# Patient Record
Sex: FEMALE | Race: ASIAN | NOT HISPANIC OR LATINO
[De-identification: names, ages, dates, MRNs, and addresses within clinical notes are randomized per-mention and may not be internally consistent; named-entity substitution may affect disease eponyms.]

---

## 2017-01-09 ENCOUNTER — RX RENEWAL (OUTPATIENT)
Age: 27
End: 2017-01-09

## 2017-02-21 ENCOUNTER — RESULT REVIEW (OUTPATIENT)
Age: 27
End: 2017-02-21

## 2017-02-22 ENCOUNTER — APPOINTMENT (OUTPATIENT)
Dept: HEMATOLOGY ONCOLOGY | Facility: CLINIC | Age: 27
End: 2017-02-22

## 2017-02-22 ENCOUNTER — OUTPATIENT (OUTPATIENT)
Dept: OUTPATIENT SERVICES | Facility: HOSPITAL | Age: 27
LOS: 1 days | Discharge: HOME | End: 2017-02-22

## 2017-02-22 VITALS
DIASTOLIC BLOOD PRESSURE: 71 MMHG | TEMPERATURE: 98.2 F | RESPIRATION RATE: 16 BRPM | WEIGHT: 120.37 LBS | BODY MASS INDEX: 22.74 KG/M2 | OXYGEN SATURATION: 95 % | HEART RATE: 95 BPM | SYSTOLIC BLOOD PRESSURE: 109 MMHG

## 2017-02-22 DIAGNOSIS — D56.3 THALASSEMIA MINOR: ICD-10-CM

## 2017-02-22 LAB
BASOPHILS # BLD AUTO: 0 K/UL — SIGNIFICANT CHANGE UP (ref 0–0.2)
BASOPHILS NFR BLD AUTO: 0.4 % — SIGNIFICANT CHANGE UP (ref 0–2)
EOSINOPHIL # BLD AUTO: 0.1 K/UL — SIGNIFICANT CHANGE UP (ref 0–0.5)
EOSINOPHIL NFR BLD AUTO: 0.7 % — SIGNIFICANT CHANGE UP (ref 0–6)
HCT VFR BLD CALC: 31.2 % — LOW (ref 34.5–45)
HGB BLD-MCNC: 9.9 G/DL — LOW (ref 11.5–15.5)
LYMPHOCYTES # BLD AUTO: 2.6 K/UL — SIGNIFICANT CHANGE UP (ref 1–3.3)
LYMPHOCYTES # BLD AUTO: 25.8 % — SIGNIFICANT CHANGE UP (ref 13–44)
MCHC RBC-ENTMCNC: 20.4 PG — LOW (ref 27–34)
MCHC RBC-ENTMCNC: 31.7 G/DL — LOW (ref 32–36)
MCV RBC AUTO: 64.5 FL — LOW (ref 80–100)
MONOCYTES # BLD AUTO: 0.6 K/UL — SIGNIFICANT CHANGE UP (ref 0–0.9)
MONOCYTES NFR BLD AUTO: 5.8 % — SIGNIFICANT CHANGE UP (ref 2–14)
NEUTROPHILS # BLD AUTO: 6.7 K/UL — SIGNIFICANT CHANGE UP (ref 1.8–7.4)
NEUTROPHILS NFR BLD AUTO: 67.2 % — SIGNIFICANT CHANGE UP (ref 43–77)
PLATELET # BLD AUTO: 237 K/UL — SIGNIFICANT CHANGE UP (ref 150–400)
RBC # BLD: 4.84 M/UL — SIGNIFICANT CHANGE UP (ref 3.8–5.2)
RBC # FLD: 13.2 % — SIGNIFICANT CHANGE UP (ref 10.3–14.5)
WBC # BLD: 10 K/UL — SIGNIFICANT CHANGE UP (ref 3.8–10.5)
WBC # FLD AUTO: 10 K/UL — SIGNIFICANT CHANGE UP (ref 3.8–10.5)

## 2017-04-07 ENCOUNTER — RX RENEWAL (OUTPATIENT)
Age: 27
End: 2017-04-07

## 2017-04-27 ENCOUNTER — EMERGENCY (EMERGENCY)
Facility: HOSPITAL | Age: 27
LOS: 1 days | Discharge: HOME | End: 2017-04-27
Attending: EMERGENCY MEDICINE | Admitting: EMERGENCY MEDICINE
Payer: COMMERCIAL

## 2017-04-27 VITALS
DIASTOLIC BLOOD PRESSURE: 75 MMHG | OXYGEN SATURATION: 100 % | SYSTOLIC BLOOD PRESSURE: 125 MMHG | RESPIRATION RATE: 18 BRPM | TEMPERATURE: 98 F | HEART RATE: 72 BPM

## 2017-04-27 PROCEDURE — 99284 EMERGENCY DEPT VISIT MOD MDM: CPT

## 2017-04-27 RX ORDER — IBUPROFEN 200 MG
400 TABLET ORAL ONCE
Qty: 0 | Refills: 0 | Status: COMPLETED | OUTPATIENT
Start: 2017-04-27 | End: 2017-04-27

## 2017-04-27 RX ORDER — CYCLOBENZAPRINE HYDROCHLORIDE 10 MG/1
1 TABLET, FILM COATED ORAL
Qty: 10 | Refills: 0 | OUTPATIENT
Start: 2017-04-27 | End: 2017-05-02

## 2017-04-27 RX ORDER — IBUPROFEN 200 MG
1 TABLET ORAL
Qty: 10 | Refills: 0 | OUTPATIENT
Start: 2017-04-27 | End: 2017-05-02

## 2017-04-27 RX ORDER — KETOROLAC TROMETHAMINE 30 MG/ML
15 SYRINGE (ML) INJECTION ONCE
Qty: 0 | Refills: 0 | Status: DISCONTINUED | OUTPATIENT
Start: 2017-04-27 | End: 2017-04-27

## 2017-04-27 RX ADMIN — Medication 400 MILLIGRAM(S): at 18:38

## 2017-04-27 NOTE — ED PROVIDER NOTE - MEDICAL DECISION MAKING DETAILS
27 y/o female with muscle spasm after incorrect bending while lifting a table. Will give pain medication and D/C home on pain meds and muscle relaxants.

## 2017-04-27 NOTE — ED ADULT TRIAGE NOTE - CHIEF COMPLAINT QUOTE
pt c/o lower back pain x 1 week. pt states pain started after doing some cleaning and lifting. pt denies dysuria.

## 2017-04-27 NOTE — ED PROVIDER NOTE - NS ED MD SCRIBE ATTENDING SCRIBE SECTIONS
VITAL SIGNS( Pullset)/DISPOSITION/PAST MEDICAL/SURGICAL/SOCIAL HISTORY/HISTORY OF PRESENT ILLNESS/PHYSICAL EXAM/REVIEW OF SYSTEMS/HIV

## 2017-04-27 NOTE — ED PROVIDER NOTE - PLAN OF CARE
Rest, drink plenty of fluids.  Advance activity as tolerated.  Continue all previously prescribed medications as directed.  Take Motrin 400 mg every 12 hours as needed for moderate pain -- take with food. Take Flexeril 5 mg every 12 hours as needed for muscle spasm -- causes drowsiness; no drinking alcohol or driving with this medication. Follow up with your primary care physician in 48-72 hours- bring copies of your results.  Return to the ER for worsening or persistent symptoms, and/or ANY NEW OR CONCERNING SYMPTOMS. If you have issues obtaining follow up, please call: [phone] to obtain a doctor or specialist who takes your insurance in your area.

## 2017-04-27 NOTE — ED PROVIDER NOTE - CARE PLAN
Principal Discharge DX:	Back pain Principal Discharge DX:	Back pain  Instructions for follow-up, activity and diet:	Rest, drink plenty of fluids.  Advance activity as tolerated.  Continue all previously prescribed medications as directed.  Take Motrin 400 mg every 12 hours as needed for moderate pain -- take with food. Take Flexeril 5 mg every 12 hours as needed for muscle spasm -- causes drowsiness; no drinking alcohol or driving with this medication. Follow up with your primary care physician in 48-72 hours- bring copies of your results.  Return to the ER for worsening or persistent symptoms, and/or ANY NEW OR CONCERNING SYMPTOMS. If you have issues obtaining follow up, please call: [phone] to obtain a doctor or specialist who takes your insurance in your area.

## 2017-04-27 NOTE — ED PROVIDER NOTE - CHPI ED SYMPTOMS NEG
no bowel dysfunction/no bladder dysfunction/no fever no chills, no abd pain, no other complaints/no tingling/no numbness

## 2017-04-27 NOTE — ED PROVIDER NOTE - OBJECTIVE STATEMENT
25 y/o female with PMHx of thalassemia trait presents to the ED c/o lower back pain x 1 week s/p lifting. Pt reports lifting a heavy table prior to the onset of the pain. Denies fever, chills, abd pain, N/V, numbness, tingling, weakness, bowel/bladder incontinence, and any other complaints.

## 2017-07-11 ENCOUNTER — RX RENEWAL (OUTPATIENT)
Age: 27
End: 2017-07-11

## 2017-07-28 ENCOUNTER — OUTPATIENT (OUTPATIENT)
Dept: OUTPATIENT SERVICES | Facility: HOSPITAL | Age: 27
LOS: 1 days | Discharge: HOME | End: 2017-07-28

## 2017-07-28 DIAGNOSIS — D56.3 THALASSEMIA MINOR: ICD-10-CM

## 2017-08-02 ENCOUNTER — RESULT REVIEW (OUTPATIENT)
Age: 27
End: 2017-08-02

## 2017-08-02 ENCOUNTER — APPOINTMENT (OUTPATIENT)
Dept: HEMATOLOGY ONCOLOGY | Facility: CLINIC | Age: 27
End: 2017-08-02
Payer: COMMERCIAL

## 2017-08-02 VITALS
RESPIRATION RATE: 16 BRPM | OXYGEN SATURATION: 98 % | HEART RATE: 88 BPM | TEMPERATURE: 98 F | DIASTOLIC BLOOD PRESSURE: 70 MMHG | BODY MASS INDEX: 22.29 KG/M2 | SYSTOLIC BLOOD PRESSURE: 110 MMHG | WEIGHT: 117.95 LBS

## 2017-08-02 LAB
BASOPHILS # BLD AUTO: 0 K/UL — SIGNIFICANT CHANGE UP (ref 0–0.2)
BASOPHILS NFR BLD AUTO: 0.4 % — SIGNIFICANT CHANGE UP (ref 0–2)
EOSINOPHIL # BLD AUTO: 0.1 K/UL — SIGNIFICANT CHANGE UP (ref 0–0.5)
EOSINOPHIL NFR BLD AUTO: 1.2 % — SIGNIFICANT CHANGE UP (ref 0–6)
HCT VFR BLD CALC: 30.1 % — LOW (ref 34.5–45)
HGB BLD-MCNC: 10.3 G/DL — LOW (ref 11.5–15.5)
LYMPHOCYTES # BLD AUTO: 2.2 K/UL — SIGNIFICANT CHANGE UP (ref 1–3.3)
LYMPHOCYTES # BLD AUTO: 27.3 % — SIGNIFICANT CHANGE UP (ref 13–44)
MCHC RBC-ENTMCNC: 22 PG — LOW (ref 27–34)
MCHC RBC-ENTMCNC: 34.1 G/DL — SIGNIFICANT CHANGE UP (ref 32–36)
MCV RBC AUTO: 64.5 FL — LOW (ref 80–100)
MONOCYTES # BLD AUTO: 0.7 K/UL — SIGNIFICANT CHANGE UP (ref 0–0.9)
MONOCYTES NFR BLD AUTO: 9 % — SIGNIFICANT CHANGE UP (ref 2–14)
NEUTROPHILS # BLD AUTO: 5.1 K/UL — SIGNIFICANT CHANGE UP (ref 1.8–7.4)
NEUTROPHILS NFR BLD AUTO: 62 % — SIGNIFICANT CHANGE UP (ref 43–77)
PLATELET # BLD AUTO: 211 K/UL — SIGNIFICANT CHANGE UP (ref 150–400)
RBC # BLD: 4.67 M/UL — SIGNIFICANT CHANGE UP (ref 3.8–5.2)
RBC # FLD: 13.2 % — SIGNIFICANT CHANGE UP (ref 10.3–14.5)
WBC # BLD: 8.2 K/UL — SIGNIFICANT CHANGE UP (ref 3.8–10.5)
WBC # FLD AUTO: 8.2 K/UL — SIGNIFICANT CHANGE UP (ref 3.8–10.5)

## 2017-08-02 PROCEDURE — 99213 OFFICE O/P EST LOW 20 MIN: CPT

## 2017-08-03 LAB
FERRITIN SERPL-MCNC: 329 NG/ML
IRON SATN MFR SERPL: 47 %
IRON SERPL-MCNC: 118 UG/DL
TIBC SERPL-MCNC: 249 UG/DL
UIBC SERPL-MCNC: 131 UG/DL

## 2017-11-06 ENCOUNTER — APPOINTMENT (OUTPATIENT)
Dept: ANTEPARTUM | Facility: CLINIC | Age: 27
End: 2017-11-06
Payer: COMMERCIAL

## 2017-11-06 ENCOUNTER — ASOB RESULT (OUTPATIENT)
Age: 27
End: 2017-11-06

## 2017-11-06 PROCEDURE — 76813 OB US NUCHAL MEAS 1 GEST: CPT

## 2017-11-06 PROCEDURE — 36416 COLLJ CAPILLARY BLOOD SPEC: CPT

## 2017-11-06 PROCEDURE — 76801 OB US < 14 WKS SINGLE FETUS: CPT

## 2018-01-08 ENCOUNTER — APPOINTMENT (OUTPATIENT)
Dept: ANTEPARTUM | Facility: CLINIC | Age: 28
End: 2018-01-08
Payer: COMMERCIAL

## 2018-01-08 ENCOUNTER — ASOB RESULT (OUTPATIENT)
Age: 28
End: 2018-01-08

## 2018-01-08 PROCEDURE — 76811 OB US DETAILED SNGL FETUS: CPT

## 2018-01-08 PROCEDURE — 76805 OB US >/= 14 WKS SNGL FETUS: CPT

## 2018-01-12 ENCOUNTER — ASOB RESULT (OUTPATIENT)
Age: 28
End: 2018-01-12

## 2018-01-12 ENCOUNTER — APPOINTMENT (OUTPATIENT)
Dept: ANTEPARTUM | Facility: CLINIC | Age: 28
End: 2018-01-12
Payer: COMMERCIAL

## 2018-01-12 PROCEDURE — 76816 OB US FOLLOW-UP PER FETUS: CPT

## 2018-02-02 ENCOUNTER — OUTPATIENT (OUTPATIENT)
Dept: OUTPATIENT SERVICES | Facility: HOSPITAL | Age: 28
LOS: 1 days | Discharge: HOME | End: 2018-02-02

## 2018-02-02 DIAGNOSIS — D56.3 THALASSEMIA MINOR: ICD-10-CM

## 2018-02-07 ENCOUNTER — RESULT REVIEW (OUTPATIENT)
Age: 28
End: 2018-02-07

## 2018-02-07 ENCOUNTER — APPOINTMENT (OUTPATIENT)
Dept: HEMATOLOGY ONCOLOGY | Facility: CLINIC | Age: 28
End: 2018-02-07
Payer: COMMERCIAL

## 2018-02-07 VITALS
SYSTOLIC BLOOD PRESSURE: 107 MMHG | BODY MASS INDEX: 25.2 KG/M2 | RESPIRATION RATE: 16 BRPM | WEIGHT: 133.38 LBS | HEART RATE: 90 BPM | TEMPERATURE: 98.5 F | OXYGEN SATURATION: 100 % | DIASTOLIC BLOOD PRESSURE: 68 MMHG

## 2018-02-07 LAB
BASOPHILS # BLD AUTO: 0 K/UL — SIGNIFICANT CHANGE UP (ref 0–0.2)
BASOPHILS NFR BLD AUTO: 0.2 % — SIGNIFICANT CHANGE UP (ref 0–2)
EOSINOPHIL # BLD AUTO: 0 K/UL — SIGNIFICANT CHANGE UP (ref 0–0.5)
EOSINOPHIL NFR BLD AUTO: 0 % — SIGNIFICANT CHANGE UP (ref 0–6)
FERRITIN SERPL-MCNC: 316 NG/ML
HCT VFR BLD CALC: 23.8 % — LOW (ref 34.5–45)
HGB BLD-MCNC: 8.2 G/DL — LOW (ref 11.5–15.5)
IRON SATN MFR SERPL: 55 %
IRON SERPL-MCNC: 167 UG/DL
LYMPHOCYTES # BLD AUTO: 17.7 % — SIGNIFICANT CHANGE UP (ref 13–44)
LYMPHOCYTES # BLD AUTO: 2.4 K/UL — SIGNIFICANT CHANGE UP (ref 1–3.3)
MCHC RBC-ENTMCNC: 23.1 PG — LOW (ref 27–34)
MCHC RBC-ENTMCNC: 34.6 G/DL — SIGNIFICANT CHANGE UP (ref 32–36)
MCV RBC AUTO: 66.8 FL — LOW (ref 80–100)
MONOCYTES # BLD AUTO: 1.1 K/UL — HIGH (ref 0–0.9)
MONOCYTES NFR BLD AUTO: 7.8 % — SIGNIFICANT CHANGE UP (ref 2–14)
NEUTROPHILS # BLD AUTO: 10.2 K/UL — HIGH (ref 1.8–7.4)
NEUTROPHILS NFR BLD AUTO: 74.3 % — SIGNIFICANT CHANGE UP (ref 43–77)
PLATELET # BLD AUTO: 285 K/UL — SIGNIFICANT CHANGE UP (ref 150–400)
RBC # BLD: 3.56 M/UL — LOW (ref 3.8–5.2)
RBC # FLD: 14.6 % — HIGH (ref 10.3–14.5)
TIBC SERPL-MCNC: 306 UG/DL
UIBC SERPL-MCNC: 139 UG/DL
VIT B12 SERPL-MCNC: 1587 PG/ML
WBC # BLD: 13.8 K/UL — HIGH (ref 3.8–10.5)
WBC # FLD AUTO: 13.8 K/UL — HIGH (ref 3.8–10.5)

## 2018-02-07 PROCEDURE — 99214 OFFICE O/P EST MOD 30 MIN: CPT

## 2018-03-02 ENCOUNTER — RESULT REVIEW (OUTPATIENT)
Age: 28
End: 2018-03-02

## 2018-03-02 ENCOUNTER — OUTPATIENT (OUTPATIENT)
Dept: OUTPATIENT SERVICES | Facility: HOSPITAL | Age: 28
LOS: 1 days | End: 2018-03-02
Payer: COMMERCIAL

## 2018-03-02 ENCOUNTER — APPOINTMENT (OUTPATIENT)
Dept: HEMATOLOGY ONCOLOGY | Facility: CLINIC | Age: 28
End: 2018-03-02

## 2018-03-02 DIAGNOSIS — D56.3 THALASSEMIA MINOR: ICD-10-CM

## 2018-03-02 LAB
BASOPHILS # BLD AUTO: 0 K/UL — SIGNIFICANT CHANGE UP (ref 0–0.2)
BASOPHILS NFR BLD AUTO: 0.3 % — SIGNIFICANT CHANGE UP (ref 0–2)
BLD GP AB SCN SERPL QL: NEGATIVE — SIGNIFICANT CHANGE UP
EOSINOPHIL # BLD AUTO: 0.1 K/UL — SIGNIFICANT CHANGE UP (ref 0–0.5)
EOSINOPHIL NFR BLD AUTO: 0.8 % — SIGNIFICANT CHANGE UP (ref 0–6)
HCT VFR BLD CALC: 25 % — LOW (ref 34.5–45)
HGB BLD-MCNC: 8.4 G/DL — LOW (ref 11.5–15.5)
LYMPHOCYTES # BLD AUTO: 19 % — SIGNIFICANT CHANGE UP (ref 13–44)
LYMPHOCYTES # BLD AUTO: 2.5 K/UL — SIGNIFICANT CHANGE UP (ref 1–3.3)
MCHC RBC-ENTMCNC: 22.9 PG — LOW (ref 27–34)
MCHC RBC-ENTMCNC: 33.5 G/DL — SIGNIFICANT CHANGE UP (ref 32–36)
MCV RBC AUTO: 68.4 FL — LOW (ref 80–100)
MONOCYTES # BLD AUTO: 0.9 K/UL — SIGNIFICANT CHANGE UP (ref 0–0.9)
MONOCYTES NFR BLD AUTO: 6.9 % — SIGNIFICANT CHANGE UP (ref 2–14)
NEUTROPHILS # BLD AUTO: 9.5 K/UL — HIGH (ref 1.8–7.4)
NEUTROPHILS NFR BLD AUTO: 73.1 % — SIGNIFICANT CHANGE UP (ref 43–77)
PLATELET # BLD AUTO: 278 K/UL — SIGNIFICANT CHANGE UP (ref 150–400)
RBC # BLD: 3.65 M/UL — LOW (ref 3.8–5.2)
RBC # FLD: 15.3 % — HIGH (ref 10.3–14.5)
RH IG SCN BLD-IMP: POSITIVE — SIGNIFICANT CHANGE UP
RH IG SCN BLD-IMP: POSITIVE — SIGNIFICANT CHANGE UP
WBC # BLD: 13 K/UL — HIGH (ref 3.8–10.5)
WBC # FLD AUTO: 13 K/UL — HIGH (ref 3.8–10.5)

## 2018-03-02 PROCEDURE — 86923 COMPATIBILITY TEST ELECTRIC: CPT

## 2018-03-02 PROCEDURE — 86900 BLOOD TYPING SEROLOGIC ABO: CPT

## 2018-03-02 PROCEDURE — 86901 BLOOD TYPING SEROLOGIC RH(D): CPT

## 2018-03-02 PROCEDURE — 86850 RBC ANTIBODY SCREEN: CPT

## 2018-03-03 ENCOUNTER — APPOINTMENT (OUTPATIENT)
Dept: INFUSION THERAPY | Facility: HOSPITAL | Age: 28
End: 2018-03-03

## 2018-03-06 ENCOUNTER — APPOINTMENT (OUTPATIENT)
Dept: MATERNAL FETAL MEDICINE | Facility: CLINIC | Age: 28
End: 2018-03-06
Payer: COMMERCIAL

## 2018-03-06 ENCOUNTER — ASOB RESULT (OUTPATIENT)
Age: 28
End: 2018-03-06

## 2018-03-06 DIAGNOSIS — Z51.89 ENCOUNTER FOR OTHER SPECIFIED AFTERCARE: ICD-10-CM

## 2018-03-06 PROCEDURE — G0109 DIAB MANAGE TRN IND/GROUP: CPT

## 2018-03-09 ENCOUNTER — ASOB RESULT (OUTPATIENT)
Age: 28
End: 2018-03-09

## 2018-03-09 ENCOUNTER — APPOINTMENT (OUTPATIENT)
Dept: MATERNAL FETAL MEDICINE | Facility: CLINIC | Age: 28
End: 2018-03-09
Payer: COMMERCIAL

## 2018-03-09 ENCOUNTER — APPOINTMENT (OUTPATIENT)
Dept: ANTEPARTUM | Facility: CLINIC | Age: 28
End: 2018-03-09
Payer: COMMERCIAL

## 2018-03-09 ENCOUNTER — MEDICATION RENEWAL (OUTPATIENT)
Age: 28
End: 2018-03-09

## 2018-03-09 PROCEDURE — 76805 OB US >/= 14 WKS SNGL FETUS: CPT

## 2018-03-09 PROCEDURE — G0108 DIAB MANAGE TRN  PER INDIV: CPT

## 2018-03-09 PROCEDURE — 76819 FETAL BIOPHYS PROFIL W/O NST: CPT

## 2018-03-23 ENCOUNTER — ASOB RESULT (OUTPATIENT)
Age: 28
End: 2018-03-23

## 2018-03-23 ENCOUNTER — APPOINTMENT (OUTPATIENT)
Dept: MATERNAL FETAL MEDICINE | Facility: CLINIC | Age: 28
End: 2018-03-23
Payer: COMMERCIAL

## 2018-03-23 PROCEDURE — G0108 DIAB MANAGE TRN  PER INDIV: CPT

## 2018-03-30 ENCOUNTER — OUTPATIENT (OUTPATIENT)
Dept: OUTPATIENT SERVICES | Facility: HOSPITAL | Age: 28
LOS: 1 days | Discharge: HOME | End: 2018-03-30

## 2018-03-30 DIAGNOSIS — D56.3 THALASSEMIA MINOR: ICD-10-CM

## 2018-04-03 ENCOUNTER — RESULT REVIEW (OUTPATIENT)
Age: 28
End: 2018-04-03

## 2018-04-03 ENCOUNTER — APPOINTMENT (OUTPATIENT)
Dept: HEMATOLOGY ONCOLOGY | Facility: CLINIC | Age: 28
End: 2018-04-03
Payer: COMMERCIAL

## 2018-04-03 VITALS
RESPIRATION RATE: 16 BRPM | BODY MASS INDEX: 26.62 KG/M2 | SYSTOLIC BLOOD PRESSURE: 106 MMHG | DIASTOLIC BLOOD PRESSURE: 61 MMHG | TEMPERATURE: 98.4 F | WEIGHT: 140.87 LBS | OXYGEN SATURATION: 100 % | HEART RATE: 93 BPM

## 2018-04-03 DIAGNOSIS — Z34.90 ENCOUNTER FOR SUPERVISION OF NORMAL PREGNANCY, UNSPECIFIED, UNSPECIFIED TRIMESTER: ICD-10-CM

## 2018-04-03 DIAGNOSIS — D64.9 ANEMIA, UNSPECIFIED: ICD-10-CM

## 2018-04-03 LAB
BASOPHILS # BLD AUTO: 0 K/UL — SIGNIFICANT CHANGE UP (ref 0–0.2)
BASOPHILS NFR BLD AUTO: 0.2 % — SIGNIFICANT CHANGE UP (ref 0–2)
EOSINOPHIL # BLD AUTO: 0.1 K/UL — SIGNIFICANT CHANGE UP (ref 0–0.5)
EOSINOPHIL NFR BLD AUTO: 1.1 % — SIGNIFICANT CHANGE UP (ref 0–6)
FERRITIN SERPL-MCNC: 195 NG/ML
HAPTOGLOB SERPL-MCNC: <20 MG/DL
HCT VFR BLD CALC: 27.8 % — LOW (ref 34.5–45)
HGB BLD-MCNC: 9.5 G/DL — LOW (ref 11.5–15.5)
IRON SATN MFR SERPL: 45 %
IRON SERPL-MCNC: 131 UG/DL
LDH SERPL-CCNC: 196 U/L
LYMPHOCYTES # BLD AUTO: 19.5 % — SIGNIFICANT CHANGE UP (ref 13–44)
LYMPHOCYTES # BLD AUTO: 2.3 K/UL — SIGNIFICANT CHANGE UP (ref 1–3.3)
MCHC RBC-ENTMCNC: 23.6 PG — LOW (ref 27–34)
MCHC RBC-ENTMCNC: 34.1 G/DL — SIGNIFICANT CHANGE UP (ref 32–36)
MCV RBC AUTO: 69.2 FL — LOW (ref 80–100)
MONOCYTES # BLD AUTO: 0.7 K/UL — SIGNIFICANT CHANGE UP (ref 0–0.9)
MONOCYTES NFR BLD AUTO: 5.9 % — SIGNIFICANT CHANGE UP (ref 2–14)
NEUTROPHILS # BLD AUTO: 8.7 K/UL — HIGH (ref 1.8–7.4)
NEUTROPHILS NFR BLD AUTO: 73.2 % — SIGNIFICANT CHANGE UP (ref 43–77)
PLATELET # BLD AUTO: 241 K/UL — SIGNIFICANT CHANGE UP (ref 150–400)
RBC # BLD: 4.02 M/UL — SIGNIFICANT CHANGE UP (ref 3.8–5.2)
RBC # FLD: 15.2 % — HIGH (ref 10.3–14.5)
RETICS #: 155 K/UL — HIGH (ref 25–125)
RETICS/RBC NFR: 3.9 % — HIGH (ref 0.5–2.5)
TIBC SERPL-MCNC: 294 UG/DL
UIBC SERPL-MCNC: 163 UG/DL
VIT B12 SERPL-MCNC: 1188 PG/ML
WBC # BLD: 11.9 K/UL — HIGH (ref 3.8–10.5)
WBC # FLD AUTO: 11.9 K/UL — HIGH (ref 3.8–10.5)

## 2018-04-03 PROCEDURE — 99214 OFFICE O/P EST MOD 30 MIN: CPT

## 2018-04-06 ENCOUNTER — APPOINTMENT (OUTPATIENT)
Dept: ANTEPARTUM | Facility: CLINIC | Age: 28
End: 2018-04-06
Payer: COMMERCIAL

## 2018-04-06 ENCOUNTER — ASOB RESULT (OUTPATIENT)
Age: 28
End: 2018-04-06

## 2018-04-06 ENCOUNTER — APPOINTMENT (OUTPATIENT)
Dept: MATERNAL FETAL MEDICINE | Facility: CLINIC | Age: 28
End: 2018-04-06
Payer: COMMERCIAL

## 2018-04-06 PROCEDURE — G0108 DIAB MANAGE TRN  PER INDIV: CPT

## 2018-04-06 PROCEDURE — 76816 OB US FOLLOW-UP PER FETUS: CPT

## 2018-04-06 PROCEDURE — 76819 FETAL BIOPHYS PROFIL W/O NST: CPT

## 2018-04-26 ENCOUNTER — APPOINTMENT (OUTPATIENT)
Dept: HEMATOLOGY ONCOLOGY | Facility: CLINIC | Age: 28
End: 2018-04-26

## 2018-04-26 ENCOUNTER — OUTPATIENT (OUTPATIENT)
Dept: OUTPATIENT SERVICES | Facility: HOSPITAL | Age: 28
LOS: 1 days | End: 2018-04-26

## 2018-04-26 ENCOUNTER — RESULT REVIEW (OUTPATIENT)
Age: 28
End: 2018-04-26

## 2018-04-26 ENCOUNTER — APPOINTMENT (OUTPATIENT)
Dept: ANTEPARTUM | Facility: CLINIC | Age: 28
End: 2018-04-26
Payer: COMMERCIAL

## 2018-04-26 ENCOUNTER — ASOB RESULT (OUTPATIENT)
Age: 28
End: 2018-04-26

## 2018-04-26 ENCOUNTER — APPOINTMENT (OUTPATIENT)
Dept: MATERNAL FETAL MEDICINE | Facility: CLINIC | Age: 28
End: 2018-04-26
Payer: COMMERCIAL

## 2018-04-26 ENCOUNTER — OUTPATIENT (OUTPATIENT)
Dept: OUTPATIENT SERVICES | Facility: HOSPITAL | Age: 28
LOS: 1 days | End: 2018-04-26
Payer: COMMERCIAL

## 2018-04-26 DIAGNOSIS — D64.9 ANEMIA, UNSPECIFIED: ICD-10-CM

## 2018-04-26 LAB
BASOPHILS # BLD AUTO: 0 K/UL — SIGNIFICANT CHANGE UP (ref 0–0.2)
BASOPHILS NFR BLD AUTO: 0.2 % — SIGNIFICANT CHANGE UP (ref 0–2)
BLD GP AB SCN SERPL QL: NEGATIVE — SIGNIFICANT CHANGE UP
EOSINOPHIL # BLD AUTO: 0.1 K/UL — SIGNIFICANT CHANGE UP (ref 0–0.5)
EOSINOPHIL NFR BLD AUTO: 0.5 % — SIGNIFICANT CHANGE UP (ref 0–6)
HCT VFR BLD CALC: 30.6 % — LOW (ref 34.5–45)
HGB BLD-MCNC: 10.5 G/DL — LOW (ref 11.5–15.5)
LYMPHOCYTES # BLD AUTO: 17.5 % — SIGNIFICANT CHANGE UP (ref 13–44)
LYMPHOCYTES # BLD AUTO: 2 K/UL — SIGNIFICANT CHANGE UP (ref 1–3.3)
MCHC RBC-ENTMCNC: 23.9 PG — LOW (ref 27–34)
MCHC RBC-ENTMCNC: 34.5 G/DL — SIGNIFICANT CHANGE UP (ref 32–36)
MCV RBC AUTO: 69.3 FL — LOW (ref 80–100)
MONOCYTES # BLD AUTO: 0.9 K/UL — SIGNIFICANT CHANGE UP (ref 0–0.9)
MONOCYTES NFR BLD AUTO: 7.4 % — SIGNIFICANT CHANGE UP (ref 2–14)
NEUTROPHILS # BLD AUTO: 8.6 K/UL — HIGH (ref 1.8–7.4)
NEUTROPHILS NFR BLD AUTO: 74.4 % — SIGNIFICANT CHANGE UP (ref 43–77)
PLATELET # BLD AUTO: 254 K/UL — SIGNIFICANT CHANGE UP (ref 150–400)
RBC # BLD: 4.41 M/UL — SIGNIFICANT CHANGE UP (ref 3.8–5.2)
RBC # FLD: 14.7 % — HIGH (ref 10.3–14.5)
RH IG SCN BLD-IMP: POSITIVE — SIGNIFICANT CHANGE UP
WBC # BLD: 11.6 K/UL — HIGH (ref 3.8–10.5)
WBC # FLD AUTO: 11.6 K/UL — HIGH (ref 3.8–10.5)

## 2018-04-26 PROCEDURE — 86900 BLOOD TYPING SEROLOGIC ABO: CPT

## 2018-04-26 PROCEDURE — 76818 FETAL BIOPHYS PROFILE W/NST: CPT | Mod: 26

## 2018-04-26 PROCEDURE — 76816 OB US FOLLOW-UP PER FETUS: CPT

## 2018-04-26 PROCEDURE — 86901 BLOOD TYPING SEROLOGIC RH(D): CPT

## 2018-04-26 PROCEDURE — 86850 RBC ANTIBODY SCREEN: CPT

## 2018-04-26 PROCEDURE — 76820 UMBILICAL ARTERY ECHO: CPT

## 2018-04-26 PROCEDURE — 99241 OFFICE CONSULTATION NEW/ESTAB PATIENT 15 MIN: CPT | Mod: 25

## 2018-04-26 PROCEDURE — G0108 DIAB MANAGE TRN  PER INDIV: CPT

## 2018-04-27 LAB
FERRITIN SERPL-MCNC: 217 NG/ML
IRON SATN MFR SERPL: 44 %
IRON SERPL-MCNC: 139 UG/DL
TIBC SERPL-MCNC: 319 UG/DL
UIBC SERPL-MCNC: 180 UG/DL

## 2018-05-04 ENCOUNTER — ASOB RESULT (OUTPATIENT)
Age: 28
End: 2018-05-04

## 2018-05-04 ENCOUNTER — APPOINTMENT (OUTPATIENT)
Dept: ANTEPARTUM | Facility: CLINIC | Age: 28
End: 2018-05-04
Payer: COMMERCIAL

## 2018-05-04 ENCOUNTER — OUTPATIENT (OUTPATIENT)
Dept: OUTPATIENT SERVICES | Facility: HOSPITAL | Age: 28
LOS: 1 days | End: 2018-05-04

## 2018-05-04 PROCEDURE — 76820 UMBILICAL ARTERY ECHO: CPT

## 2018-05-04 PROCEDURE — 76818 FETAL BIOPHYS PROFILE W/NST: CPT | Mod: 26

## 2018-05-06 ENCOUNTER — INPATIENT (INPATIENT)
Facility: HOSPITAL | Age: 28
LOS: 1 days | Discharge: HOME | End: 2018-05-08
Attending: OBSTETRICS & GYNECOLOGY | Admitting: OBSTETRICS & GYNECOLOGY

## 2018-05-06 ENCOUNTER — TRANSCRIPTION ENCOUNTER (OUTPATIENT)
Age: 28
End: 2018-05-06

## 2018-05-06 VITALS — WEIGHT: 141.1 LBS | HEIGHT: 62 IN

## 2018-05-06 DIAGNOSIS — O26.899 OTHER SPECIFIED PREGNANCY RELATED CONDITIONS, UNSPECIFIED TRIMESTER: ICD-10-CM

## 2018-05-06 LAB
ANISOCYTOSIS BLD QL: SIGNIFICANT CHANGE UP
BASOPHILS # BLD AUTO: 0.03 K/UL — SIGNIFICANT CHANGE UP (ref 0–0.2)
BASOPHILS NFR BLD AUTO: 0.2 % — SIGNIFICANT CHANGE UP (ref 0–2)
BASOPHILS NFR SPEC: 0 % — SIGNIFICANT CHANGE UP (ref 0–2)
BLD GP AB SCN SERPL QL: NEGATIVE — SIGNIFICANT CHANGE UP
EOSINOPHIL # BLD AUTO: 0.06 K/UL — SIGNIFICANT CHANGE UP (ref 0–0.5)
EOSINOPHIL NFR BLD AUTO: 0.4 % — SIGNIFICANT CHANGE UP (ref 0–6)
EOSINOPHIL NFR FLD: 0 % — SIGNIFICANT CHANGE UP (ref 0–6)
GIANT PLATELETS BLD QL SMEAR: PRESENT — SIGNIFICANT CHANGE UP
GLUCOSE BLDC GLUCOMTR-MCNC: 151 MG/DL — HIGH (ref 70–99)
GLUCOSE BLDC GLUCOMTR-MCNC: 90 MG/DL — SIGNIFICANT CHANGE UP (ref 70–99)
HCT VFR BLD CALC: 33.2 % — LOW (ref 34.5–45)
HGB BLD-MCNC: 10.2 G/DL — LOW (ref 11.5–15.5)
IMM GRANULOCYTES # BLD AUTO: 0.37 # — SIGNIFICANT CHANGE UP
IMM GRANULOCYTES NFR BLD AUTO: 2.4 % — HIGH (ref 0–1.5)
LYMPHOCYTES # BLD AUTO: 16.1 % — SIGNIFICANT CHANGE UP (ref 13–44)
LYMPHOCYTES # BLD AUTO: 2.52 K/UL — SIGNIFICANT CHANGE UP (ref 1–3.3)
LYMPHOCYTES NFR SPEC AUTO: 13.4 % — SIGNIFICANT CHANGE UP (ref 13–44)
MCHC RBC-ENTMCNC: 22.3 PG — LOW (ref 27–34)
MCHC RBC-ENTMCNC: 30.7 % — LOW (ref 32–36)
MCV RBC AUTO: 72.5 FL — LOW (ref 80–100)
METAMYELOCYTES # FLD: 0.9 % — SIGNIFICANT CHANGE UP (ref 0–1)
MICROCYTES BLD QL: SIGNIFICANT CHANGE UP
MONOCYTES # BLD AUTO: 1.29 K/UL — HIGH (ref 0–0.9)
MONOCYTES NFR BLD AUTO: 8.3 % — SIGNIFICANT CHANGE UP (ref 2–14)
MONOCYTES NFR BLD: 4.5 % — SIGNIFICANT CHANGE UP (ref 2–9)
NEUTROPHIL AB SER-ACNC: 81.2 % — HIGH (ref 43–77)
NEUTROPHILS # BLD AUTO: 11.34 K/UL — HIGH (ref 1.8–7.4)
NEUTROPHILS NFR BLD AUTO: 72.6 % — SIGNIFICANT CHANGE UP (ref 43–77)
NRBC # FLD: 0.03 — SIGNIFICANT CHANGE UP
PLATELET # BLD AUTO: 292 K/UL — SIGNIFICANT CHANGE UP (ref 150–400)
PLATELET COUNT - ESTIMATE: NORMAL — SIGNIFICANT CHANGE UP
PMV BLD: 10.7 FL — SIGNIFICANT CHANGE UP (ref 7–13)
POIKILOCYTOSIS BLD QL AUTO: SIGNIFICANT CHANGE UP
POLYCHROMASIA BLD QL SMEAR: SLIGHT — SIGNIFICANT CHANGE UP
RBC # BLD: 4.58 M/UL — SIGNIFICANT CHANGE UP (ref 3.8–5.2)
RBC # FLD: 15.5 % — HIGH (ref 10.3–14.5)
RH IG SCN BLD-IMP: POSITIVE — SIGNIFICANT CHANGE UP
RH IG SCN BLD-IMP: POSITIVE — SIGNIFICANT CHANGE UP
T PALLIDUM AB TITR SER: NEGATIVE — SIGNIFICANT CHANGE UP
WBC # BLD: 15.61 K/UL — HIGH (ref 3.8–10.5)
WBC # FLD AUTO: 15.61 K/UL — HIGH (ref 3.8–10.5)

## 2018-05-06 RX ORDER — GLYCERIN ADULT
1 SUPPOSITORY, RECTAL RECTAL AT BEDTIME
Qty: 0 | Refills: 0 | Status: DISCONTINUED | OUTPATIENT
Start: 2018-05-06 | End: 2018-05-08

## 2018-05-06 RX ORDER — OXYTOCIN 10 UNIT/ML
41.67 VIAL (ML) INJECTION
Qty: 20 | Refills: 0 | Status: DISCONTINUED | OUTPATIENT
Start: 2018-05-06 | End: 2018-05-06

## 2018-05-06 RX ORDER — SIMETHICONE 80 MG/1
80 TABLET, CHEWABLE ORAL EVERY 6 HOURS
Qty: 0 | Refills: 0 | Status: DISCONTINUED | OUTPATIENT
Start: 2018-05-06 | End: 2018-05-08

## 2018-05-06 RX ORDER — LANOLIN
1 OINTMENT (GRAM) TOPICAL EVERY 6 HOURS
Qty: 0 | Refills: 0 | Status: DISCONTINUED | OUTPATIENT
Start: 2018-05-06 | End: 2018-05-08

## 2018-05-06 RX ORDER — OXYTOCIN 10 UNIT/ML
41.67 VIAL (ML) INJECTION
Qty: 20 | Refills: 0 | Status: DISCONTINUED | OUTPATIENT
Start: 2018-05-06 | End: 2018-05-07

## 2018-05-06 RX ORDER — SODIUM CHLORIDE 9 MG/ML
1000 INJECTION, SOLUTION INTRAVENOUS
Qty: 0 | Refills: 0 | Status: DISCONTINUED | OUTPATIENT
Start: 2018-05-06 | End: 2018-05-06

## 2018-05-06 RX ORDER — FERROUS SULFATE 325(65) MG
1 TABLET ORAL
Qty: 0 | Refills: 0 | COMMUNITY

## 2018-05-06 RX ORDER — MAGNESIUM HYDROXIDE 400 MG/1
30 TABLET, CHEWABLE ORAL
Qty: 0 | Refills: 0 | Status: DISCONTINUED | OUTPATIENT
Start: 2018-05-06 | End: 2018-05-08

## 2018-05-06 RX ORDER — TETANUS TOXOID, REDUCED DIPHTHERIA TOXOID AND ACELLULAR PERTUSSIS VACCINE, ADSORBED 5; 2.5; 8; 8; 2.5 [IU]/.5ML; [IU]/.5ML; UG/.5ML; UG/.5ML; UG/.5ML
0.5 SUSPENSION INTRAMUSCULAR ONCE
Qty: 0 | Refills: 0 | Status: DISCONTINUED | OUTPATIENT
Start: 2018-05-06 | End: 2018-05-08

## 2018-05-06 RX ORDER — ACETAMINOPHEN 500 MG
975 TABLET ORAL EVERY 6 HOURS
Qty: 0 | Refills: 0 | Status: DISCONTINUED | OUTPATIENT
Start: 2018-05-06 | End: 2018-05-08

## 2018-05-06 RX ORDER — PRAMOXINE HYDROCHLORIDE 150 MG/15G
1 AEROSOL, FOAM RECTAL EVERY 4 HOURS
Qty: 0 | Refills: 0 | Status: DISCONTINUED | OUTPATIENT
Start: 2018-05-06 | End: 2018-05-06

## 2018-05-06 RX ORDER — IBUPROFEN 200 MG
600 TABLET ORAL EVERY 6 HOURS
Qty: 0 | Refills: 0 | Status: COMPLETED | OUTPATIENT
Start: 2018-05-06 | End: 2019-04-04

## 2018-05-06 RX ORDER — SODIUM CHLORIDE 9 MG/ML
3 INJECTION INTRAMUSCULAR; INTRAVENOUS; SUBCUTANEOUS EVERY 8 HOURS
Qty: 0 | Refills: 0 | Status: DISCONTINUED | OUTPATIENT
Start: 2018-05-06 | End: 2018-05-06

## 2018-05-06 RX ORDER — DIPHENHYDRAMINE HCL 50 MG
25 CAPSULE ORAL EVERY 6 HOURS
Qty: 0 | Refills: 0 | Status: DISCONTINUED | OUTPATIENT
Start: 2018-05-06 | End: 2018-05-08

## 2018-05-06 RX ORDER — OXYTOCIN 10 UNIT/ML
2 VIAL (ML) INJECTION
Qty: 30 | Refills: 0 | Status: DISCONTINUED | OUTPATIENT
Start: 2018-05-06 | End: 2018-05-07

## 2018-05-06 RX ORDER — KETOROLAC TROMETHAMINE 30 MG/ML
30 SYRINGE (ML) INJECTION ONCE
Qty: 0 | Refills: 0 | Status: DISCONTINUED | OUTPATIENT
Start: 2018-05-06 | End: 2018-05-06

## 2018-05-06 RX ORDER — OXYCODONE HYDROCHLORIDE 5 MG/1
5 TABLET ORAL EVERY 4 HOURS
Qty: 0 | Refills: 0 | Status: DISCONTINUED | OUTPATIENT
Start: 2018-05-06 | End: 2018-05-08

## 2018-05-06 RX ORDER — HYDROCORTISONE 1 %
1 OINTMENT (GRAM) TOPICAL EVERY 4 HOURS
Qty: 0 | Refills: 0 | Status: DISCONTINUED | OUTPATIENT
Start: 2018-05-06 | End: 2018-05-07

## 2018-05-06 RX ORDER — DIBUCAINE 1 %
1 OINTMENT (GRAM) RECTAL EVERY 4 HOURS
Qty: 0 | Refills: 0 | Status: DISCONTINUED | OUTPATIENT
Start: 2018-05-06 | End: 2018-05-08

## 2018-05-06 RX ORDER — OXYTOCIN 10 UNIT/ML
333.33 VIAL (ML) INJECTION
Qty: 20 | Refills: 0 | Status: COMPLETED | OUTPATIENT
Start: 2018-05-06

## 2018-05-06 RX ORDER — AER TRAVELER 0.5 G/1
1 SOLUTION RECTAL; TOPICAL EVERY 4 HOURS
Qty: 0 | Refills: 0 | Status: DISCONTINUED | OUTPATIENT
Start: 2018-05-06 | End: 2018-05-08

## 2018-05-06 RX ORDER — PRAMOXINE HYDROCHLORIDE 150 MG/15G
1 AEROSOL, FOAM RECTAL EVERY 4 HOURS
Qty: 0 | Refills: 0 | Status: DISCONTINUED | OUTPATIENT
Start: 2018-05-06 | End: 2018-05-07

## 2018-05-06 RX ORDER — OXYTOCIN 10 UNIT/ML
333.33 VIAL (ML) INJECTION
Qty: 20 | Refills: 0 | Status: DISCONTINUED | OUTPATIENT
Start: 2018-05-06 | End: 2018-05-07

## 2018-05-06 RX ORDER — DOCUSATE SODIUM 100 MG
100 CAPSULE ORAL
Qty: 0 | Refills: 0 | Status: DISCONTINUED | OUTPATIENT
Start: 2018-05-06 | End: 2018-05-08

## 2018-05-06 RX ORDER — ACETAMINOPHEN 500 MG
975 TABLET ORAL EVERY 6 HOURS
Qty: 0 | Refills: 0 | Status: COMPLETED | OUTPATIENT
Start: 2018-05-06 | End: 2019-04-04

## 2018-05-06 RX ORDER — HYDROCORTISONE 1 %
1 OINTMENT (GRAM) TOPICAL EVERY 4 HOURS
Qty: 0 | Refills: 0 | Status: DISCONTINUED | OUTPATIENT
Start: 2018-05-06 | End: 2018-05-06

## 2018-05-06 RX ORDER — AER TRAVELER 0.5 G/1
1 SOLUTION RECTAL; TOPICAL EVERY 4 HOURS
Qty: 0 | Refills: 0 | Status: DISCONTINUED | OUTPATIENT
Start: 2018-05-06 | End: 2018-05-06

## 2018-05-06 RX ORDER — DIBUCAINE 1 %
1 OINTMENT (GRAM) RECTAL EVERY 4 HOURS
Qty: 0 | Refills: 0 | Status: DISCONTINUED | OUTPATIENT
Start: 2018-05-06 | End: 2018-05-06

## 2018-05-06 RX ORDER — SODIUM CHLORIDE 9 MG/ML
3 INJECTION INTRAMUSCULAR; INTRAVENOUS; SUBCUTANEOUS EVERY 8 HOURS
Qty: 0 | Refills: 0 | Status: DISCONTINUED | OUTPATIENT
Start: 2018-05-06 | End: 2018-05-08

## 2018-05-06 RX ORDER — OXYCODONE HYDROCHLORIDE 5 MG/1
5 TABLET ORAL
Qty: 0 | Refills: 0 | Status: DISCONTINUED | OUTPATIENT
Start: 2018-05-06 | End: 2018-05-08

## 2018-05-06 RX ORDER — IBUPROFEN 200 MG
600 TABLET ORAL EVERY 6 HOURS
Qty: 0 | Refills: 0 | Status: DISCONTINUED | OUTPATIENT
Start: 2018-05-06 | End: 2018-05-08

## 2018-05-06 RX ADMIN — SODIUM CHLORIDE 125 MILLILITER(S): 9 INJECTION, SOLUTION INTRAVENOUS at 08:48

## 2018-05-06 RX ADMIN — Medication 1000 MILLIUNIT(S)/MIN: at 14:30

## 2018-05-06 RX ADMIN — Medication 30 MILLIGRAM(S): at 14:22

## 2018-05-06 RX ADMIN — Medication 125 MILLIUNIT(S)/MIN: at 16:23

## 2018-05-06 RX ADMIN — Medication 975 MILLIGRAM(S): at 14:23

## 2018-05-06 RX ADMIN — SODIUM CHLORIDE 125 MILLILITER(S): 9 INJECTION, SOLUTION INTRAVENOUS at 13:57

## 2018-05-06 RX ADMIN — Medication 125 MILLIUNIT(S)/MIN: at 15:47

## 2018-05-06 NOTE — DISCHARGE NOTE OB - PATIENT PORTAL LINK FT
You can access the [facility] Patient Portal, offered by [facility], by registering with the following website: [email or web address]

## 2018-05-06 NOTE — DISCHARGE NOTE OB - MEDICATION SUMMARY - MEDICATIONS TO CHANGE
I will SWITCH the dose or number of times a day I take the medications listed below when I get home from the hospital:  None I will SWITCH the dose or number of times a day I take the medications listed below when I get home from the hospital:     mg oral tablet  -- 1 tab(s) by mouth 2 times a day  -- Do not take this drug if you are pregnant.  It is very important that you take or use this exactly as directed.  Do not skip doses or discontinue unless directed by your doctor.  May cause drowsiness or dizziness.  Obtain medical advice before taking any non-prescription drugs as some may affect the action of this medication.  Take with food or milk.

## 2018-05-06 NOTE — DISCHARGE NOTE OB - CARE PROVIDER_API CALL
Kelly Acuna), Obstetrics and Gynecology  [address] [place] [address]  Phone: [phone]  Fax: [phone]

## 2018-05-06 NOTE — DISCHARGE NOTE OB - CARE PLAN
Principal Discharge DX:	Vaginal delivery  Goal:	return to normal ADLs  Assessment and plan of treatment:	stable

## 2018-05-06 NOTE — DISCHARGE NOTE OB - MATERIALS PROVIDED
[facility] [place] Screening Program/Tdap Vaccination (VIS Pub Date: 2012)/Breastfeeding Log/[facility] Hearing Screen Program/Shaken Baby Prevention Handout/[place]  Immunization Record/Guide to Postpartum Care/Vaccinations/Birth Certificate Instructions

## 2018-05-07 RX ORDER — HYDROCORTISONE 1 %
1 OINTMENT (GRAM) TOPICAL EVERY 4 HOURS
Qty: 0 | Refills: 0 | Status: DISCONTINUED | OUTPATIENT
Start: 2018-05-07 | End: 2018-05-08

## 2018-05-07 RX ORDER — PRAMOXINE HYDROCHLORIDE 150 MG/15G
1 AEROSOL, FOAM RECTAL EVERY 4 HOURS
Qty: 0 | Refills: 0 | Status: DISCONTINUED | OUTPATIENT
Start: 2018-05-07 | End: 2018-05-08

## 2018-05-07 RX ADMIN — Medication 1 TABLET(S): at 13:41

## 2018-05-07 NOTE — LACTATION INITIAL EVALUATION - LACTATION INTERVENTIONS
assisted with deeper latch and positioning .discussed  signs  of  effective  feeding and  swallowing.  discussed  compression at  breast when  nbn  stops  drinking  and  is  still sucking.   instructed  to offer both  breast at a feeding ,feed on cue and safe  skin to skin.  reviewed risks  of  formula  feeding/initiate skin to skin/initiate hand expression routine

## 2018-05-07 NOTE — PROGRESS NOTE ADULT - SUBJECTIVE AND OBJECTIVE BOX
S: Patient doing well. Minimal lochia. Pain controlled.    O: Vital Signs Last 24 Hrs  T(C): 36.8 (07 May 2018 06:26), Max: 37.3 (06 May 2018 19:53)  T(F): 98.2 (07 May 2018 06:26), Max: 99.2 (06 May 2018 19:53)  HR: 87 (07 May 2018 06:26) (73 - 92)  BP: 112/64 (07 May 2018 06:26) (100/50 - 119/58)  BP(mean): --  RR: 17 (07 May 2018 06:26) (14 - 18)  SpO2: 100% (07 May 2018 06:26) (97% - 100%)    Gen: NAD  Abd: soft, NT, ND, fundus firm below umbilicus  Lochia: moderate  Ext: no tenderness    Labs:                        10.2   15.61 )-----------( 292      ( 06 May 2018 03:20 )             33.2       A: 27y PPD# 1 s/p  doing well.    Plan: continue PP care  discharge home

## 2018-05-07 NOTE — LACTATION INITIAL EVALUATION - INTERVENTION OUTCOME
verbalizes understanding/demonstrates understanding of teaching/nbn demonstrated  deep latch and  performed  with sucking and swallowing  noted .  instructed  to  call  for assistance  if  needed.

## 2018-05-08 ENCOUNTER — OUTPATIENT (OUTPATIENT)
Dept: OUTPATIENT SERVICES | Facility: HOSPITAL | Age: 28
LOS: 1 days | Discharge: HOME | End: 2018-05-08

## 2018-05-08 ENCOUNTER — APPOINTMENT (OUTPATIENT)
Dept: ANTEPARTUM | Facility: CLINIC | Age: 28
End: 2018-05-08

## 2018-05-08 ENCOUNTER — APPOINTMENT (OUTPATIENT)
Dept: ANTEPARTUM | Facility: HOSPITAL | Age: 28
End: 2018-05-08

## 2018-05-08 VITALS
OXYGEN SATURATION: 99 % | DIASTOLIC BLOOD PRESSURE: 66 MMHG | RESPIRATION RATE: 18 BRPM | HEART RATE: 83 BPM | TEMPERATURE: 98 F | SYSTOLIC BLOOD PRESSURE: 111 MMHG

## 2018-05-08 DIAGNOSIS — D56.3 THALASSEMIA MINOR: ICD-10-CM

## 2018-05-08 RX ORDER — IBUPROFEN 200 MG
1 TABLET ORAL
Qty: 0 | Refills: 0 | COMMUNITY
Start: 2018-05-08

## 2018-05-08 RX ORDER — ACETAMINOPHEN 500 MG
3 TABLET ORAL
Qty: 0 | Refills: 0 | COMMUNITY
Start: 2018-05-08

## 2018-05-10 ENCOUNTER — APPOINTMENT (OUTPATIENT)
Dept: HEMATOLOGY ONCOLOGY | Facility: CLINIC | Age: 28
End: 2018-05-10

## 2018-05-11 ENCOUNTER — APPOINTMENT (OUTPATIENT)
Dept: ANTEPARTUM | Facility: CLINIC | Age: 28
End: 2018-05-11

## 2018-05-11 ENCOUNTER — APPOINTMENT (OUTPATIENT)
Dept: ANTEPARTUM | Facility: HOSPITAL | Age: 28
End: 2018-05-11

## 2018-05-14 DIAGNOSIS — O24.410 GESTATIONAL DIABETES MELLITUS IN PREGNANCY, DIET CONTROLLED: ICD-10-CM

## 2018-05-14 DIAGNOSIS — O99.113 OTHER DISEASES OF THE BLOOD AND BLOOD-FORMING ORGANS AND CERTAIN DISORDERS INVOLVING THE IMMUNE MECHANISM COMPLICATING PREGNANCY, THIRD TRIMESTER: ICD-10-CM

## 2018-05-14 DIAGNOSIS — O41.93X0 DISORDER OF AMNIOTIC FLUID AND MEMBRANES, UNSPECIFIED, THIRD TRIMESTER, NOT APPLICABLE OR UNSPECIFIED: ICD-10-CM

## 2018-05-14 DIAGNOSIS — O09.293 SUPERVISION OF PREGNANCY WITH OTHER POOR REPRODUCTIVE OR OBSTETRIC HISTORY, THIRD TRIMESTER: ICD-10-CM

## 2018-05-14 DIAGNOSIS — Z3A.37 37 WEEKS GESTATION OF PREGNANCY: ICD-10-CM

## 2018-06-04 DIAGNOSIS — O09.293 SUPERVISION OF PREGNANCY WITH OTHER POOR REPRODUCTIVE OR OBSTETRIC HISTORY, THIRD TRIMESTER: ICD-10-CM

## 2018-06-04 DIAGNOSIS — O99.113 OTHER DISEASES OF THE BLOOD AND BLOOD-FORMING ORGANS AND CERTAIN DISORDERS INVOLVING THE IMMUNE MECHANISM COMPLICATING PREGNANCY, THIRD TRIMESTER: ICD-10-CM

## 2018-06-04 DIAGNOSIS — Z3A.36 36 WEEKS GESTATION OF PREGNANCY: ICD-10-CM

## 2018-06-04 DIAGNOSIS — O41.93X0 DISORDER OF AMNIOTIC FLUID AND MEMBRANES, UNSPECIFIED, THIRD TRIMESTER, NOT APPLICABLE OR UNSPECIFIED: ICD-10-CM

## 2018-06-08 ENCOUNTER — OUTPATIENT (OUTPATIENT)
Dept: OUTPATIENT SERVICES | Facility: HOSPITAL | Age: 28
LOS: 1 days | Discharge: HOME | End: 2018-06-08

## 2018-06-08 DIAGNOSIS — D56.3 THALASSEMIA MINOR: ICD-10-CM

## 2018-06-12 ENCOUNTER — RESULT REVIEW (OUTPATIENT)
Age: 28
End: 2018-06-12

## 2018-06-12 ENCOUNTER — APPOINTMENT (OUTPATIENT)
Dept: HEMATOLOGY ONCOLOGY | Facility: CLINIC | Age: 28
End: 2018-06-12
Payer: COMMERCIAL

## 2018-06-12 VITALS
HEART RATE: 81 BPM | OXYGEN SATURATION: 99 % | DIASTOLIC BLOOD PRESSURE: 70 MMHG | WEIGHT: 134.48 LBS | SYSTOLIC BLOOD PRESSURE: 110 MMHG | RESPIRATION RATE: 16 BRPM | BODY MASS INDEX: 25.41 KG/M2 | TEMPERATURE: 98 F

## 2018-06-12 DIAGNOSIS — Z01.419 ENCOUNTER FOR GYNECOLOGICAL EXAMINATION (GENERAL) (ROUTINE) W/OUT ABNORMAL FINDINGS: ICD-10-CM

## 2018-06-12 DIAGNOSIS — O24.919 UNSPECIFIED DIABETES MELLITUS IN PREGNANCY, UNSPECIFIED TRIMESTER: ICD-10-CM

## 2018-06-12 LAB
BASOPHILS # BLD AUTO: 0 K/UL — SIGNIFICANT CHANGE UP (ref 0–0.2)
BASOPHILS NFR BLD AUTO: 0.4 % — SIGNIFICANT CHANGE UP (ref 0–2)
EOSINOPHIL # BLD AUTO: 0.2 K/UL — SIGNIFICANT CHANGE UP (ref 0–0.5)
EOSINOPHIL NFR BLD AUTO: 2.1 % — SIGNIFICANT CHANGE UP (ref 0–6)
HCT VFR BLD CALC: 34.9 % — SIGNIFICANT CHANGE UP (ref 34.5–45)
HGB BLD-MCNC: 11.4 G/DL — LOW (ref 11.5–15.5)
IRON SATN MFR SERPL: 29 %
IRON SERPL-MCNC: 73 UG/DL
LYMPHOCYTES # BLD AUTO: 2.6 K/UL — SIGNIFICANT CHANGE UP (ref 1–3.3)
LYMPHOCYTES # BLD AUTO: 29 % — SIGNIFICANT CHANGE UP (ref 13–44)
MCHC RBC-ENTMCNC: 21.9 PG — LOW (ref 27–34)
MCHC RBC-ENTMCNC: 32.8 G/DL — SIGNIFICANT CHANGE UP (ref 32–36)
MCV RBC AUTO: 66.8 FL — LOW (ref 80–100)
MONOCYTES # BLD AUTO: 0.6 K/UL — SIGNIFICANT CHANGE UP (ref 0–0.9)
MONOCYTES NFR BLD AUTO: 6.4 % — SIGNIFICANT CHANGE UP (ref 2–14)
NEUTROPHILS # BLD AUTO: 5.6 K/UL — SIGNIFICANT CHANGE UP (ref 1.8–7.4)
NEUTROPHILS NFR BLD AUTO: 62.1 % — SIGNIFICANT CHANGE UP (ref 43–77)
PLATELET # BLD AUTO: 253 K/UL — SIGNIFICANT CHANGE UP (ref 150–400)
RBC # BLD: 5.22 M/UL — HIGH (ref 3.8–5.2)
RBC # FLD: 12.8 % — SIGNIFICANT CHANGE UP (ref 10.3–14.5)
TIBC SERPL-MCNC: 251 UG/DL
UIBC SERPL-MCNC: 178 UG/DL
WBC # BLD: 9 K/UL — SIGNIFICANT CHANGE UP (ref 3.8–10.5)
WBC # FLD AUTO: 9 K/UL — SIGNIFICANT CHANGE UP (ref 3.8–10.5)

## 2018-06-12 PROCEDURE — 99213 OFFICE O/P EST LOW 20 MIN: CPT

## 2018-06-12 RX ORDER — LANCETS 33 GAUGE
EACH MISCELLANEOUS
Qty: 1 | Refills: 2 | Status: DISCONTINUED | COMMUNITY
Start: 2018-03-06 | End: 2018-06-12

## 2018-06-12 RX ORDER — BLOOD SUGAR DIAGNOSTIC
STRIP MISCELLANEOUS 4 TIMES DAILY
Qty: 1 | Refills: 5 | Status: DISCONTINUED | COMMUNITY
Start: 2018-03-06 | End: 2018-06-12

## 2018-06-13 LAB
25(OH)D3 SERPL-MCNC: 31.3 NG/ML
FERRITIN SERPL-MCNC: 399 NG/ML
VIT B12 SERPL-MCNC: >2000 PG/ML

## 2018-06-29 ENCOUNTER — RX RENEWAL (OUTPATIENT)
Age: 28
End: 2018-06-29

## 2018-10-19 ENCOUNTER — EMERGENCY (EMERGENCY)
Facility: HOSPITAL | Age: 28
LOS: 1 days | Discharge: HOME | End: 2018-10-19
Attending: EMERGENCY MEDICINE | Admitting: EMERGENCY MEDICINE
Payer: COMMERCIAL

## 2018-10-19 VITALS
HEART RATE: 87 BPM | OXYGEN SATURATION: 100 % | TEMPERATURE: 98 F | SYSTOLIC BLOOD PRESSURE: 115 MMHG | RESPIRATION RATE: 16 BRPM | DIASTOLIC BLOOD PRESSURE: 72 MMHG

## 2018-10-19 PROBLEM — D56.3 THALASSEMIA MINOR: Chronic | Status: ACTIVE | Noted: 2017-04-27

## 2018-10-19 PROCEDURE — 99283 EMERGENCY DEPT VISIT LOW MDM: CPT

## 2018-10-19 NOTE — ED ADULT TRIAGE NOTE - CHIEF COMPLAINT QUOTE
pt states noticed cyst to right mid back a 1 yr but started to become swollen and pain x 2-3 days. Another cyst noticed yesterday to right axilla. Denies fever/chills

## 2018-10-19 NOTE — ED PROVIDER NOTE - OBJECTIVE STATEMENT
28 F c/o cyst to the back x 1 year, became slightly painful over the last few days. Also noted cyst under R arm over last 2 days similar to back cyst which is also slightly painful but does not affect her ability to carry out daily activities. Denies cp/sob, f/c, n/v/d, weight changes, rashes, travel, sick contacts.

## 2018-10-19 NOTE — ED PROVIDER NOTE - PHYSICAL EXAMINATION
General: Alert and Oriented. No apparent distress.  Head: Normocephalic and atraumatic.  Eyes: PERRLA with EOMI.  Neck: Supple. Trachea midline.   Cardiac: Normal S1 and S2 w/ RRR. No murmurs appreciated.   Pulmonary: Vesicular breath sounds bilaterally. No increased WOB. No wheezes or crackles.  Abdominal: Soft, non-tender. Normoactive bowel sounds.  Neurologic: No focal sensory or motor deficits.  Musculoskeletal: Strength appropriate in all 4 extremities for age with no limited ROM.  Skin: Color appropriate for race. Intact, warm, and well-perfused. Small 2cm firm nodule over R upper back w/o skin erythema or edema, mild TTP not fluctuant.. Small firm 1cm nodule under R axilla w/o skin changes, mild TTP not fluctuant  Psychiatric: Appropriate mood and affect. No apparent risk to self or others.

## 2018-10-19 NOTE — ED PROVIDER NOTE - MEDICAL DECISION MAKING DETAILS
Pt presenting due to chronic skin changes described as swelling. ON exam has 2 small firm masses not concerning for abscess given no skin changes no  fever non fluctuant. Patient likely has fibroma/lipoma and possibly folliculitis under the arm. Plan: reassurance, instructions for symptomatic management.

## 2018-10-19 NOTE — ED PROVIDER NOTE - ATTENDING CONTRIBUTION TO CARE
Seen and examined, NAD at time of exam, c/o gradual onset of pain and swelling to L upper back, noted non tender growth x yrs. but recently inc. size and more bothersome, denies redness, discharge or fevers, states began with "blackhead" in center. Also states R axillary small area of swelling and mild pain, no redness, no discharge. Likely fibroma to back, flat, firm, mildly tender, and folliculitis to L axillary. Warm compress, NSAIDs, Derm referral.

## 2018-10-23 ENCOUNTER — APPOINTMENT (OUTPATIENT)
Dept: DERMATOLOGY | Facility: CLINIC | Age: 28
End: 2018-10-23
Payer: COMMERCIAL

## 2018-10-23 VITALS
DIASTOLIC BLOOD PRESSURE: 66 MMHG | HEIGHT: 62 IN | SYSTOLIC BLOOD PRESSURE: 104 MMHG | BODY MASS INDEX: 22.08 KG/M2 | WEIGHT: 120 LBS

## 2018-10-23 DIAGNOSIS — Z78.9 OTHER SPECIFIED HEALTH STATUS: ICD-10-CM

## 2018-10-23 DIAGNOSIS — Z87.09 PERSONAL HISTORY OF OTHER DISEASES OF THE RESPIRATORY SYSTEM: ICD-10-CM

## 2018-10-23 PROCEDURE — 99202 OFFICE O/P NEW SF 15 MIN: CPT

## 2018-10-29 ENCOUNTER — APPOINTMENT (OUTPATIENT)
Dept: DERMATOLOGY | Facility: CLINIC | Age: 28
End: 2018-10-29
Payer: COMMERCIAL

## 2018-10-29 ENCOUNTER — MEDICATION RENEWAL (OUTPATIENT)
Age: 28
End: 2018-10-29

## 2018-10-29 VITALS — DIASTOLIC BLOOD PRESSURE: 70 MMHG | SYSTOLIC BLOOD PRESSURE: 110 MMHG

## 2018-10-29 PROCEDURE — 11900 INJECT SKIN LESIONS </W 7: CPT

## 2018-10-29 PROCEDURE — 99213 OFFICE O/P EST LOW 20 MIN: CPT | Mod: 25

## 2018-11-05 ENCOUNTER — LABORATORY RESULT (OUTPATIENT)
Age: 28
End: 2018-11-05

## 2018-11-06 ENCOUNTER — APPOINTMENT (OUTPATIENT)
Dept: DERMATOLOGY | Facility: CLINIC | Age: 28
End: 2018-11-06
Payer: COMMERCIAL

## 2018-11-06 ENCOUNTER — LABORATORY RESULT (OUTPATIENT)
Age: 28
End: 2018-11-06

## 2018-11-06 VITALS — DIASTOLIC BLOOD PRESSURE: 58 MMHG | SYSTOLIC BLOOD PRESSURE: 102 MMHG

## 2018-11-06 PROCEDURE — 99214 OFFICE O/P EST MOD 30 MIN: CPT | Mod: 25

## 2018-11-06 PROCEDURE — 11100 BX SKIN SUBCUTANEOUS&/MUCOUS MEMBRANE 1 LESION: CPT

## 2018-11-20 ENCOUNTER — APPOINTMENT (OUTPATIENT)
Dept: DERMATOLOGY | Facility: CLINIC | Age: 28
End: 2018-11-20
Payer: COMMERCIAL

## 2018-11-20 DIAGNOSIS — L72.3 SEBACEOUS CYST: ICD-10-CM

## 2018-11-20 PROCEDURE — 99213 OFFICE O/P EST LOW 20 MIN: CPT

## 2018-12-05 ENCOUNTER — OUTPATIENT (OUTPATIENT)
Dept: OUTPATIENT SERVICES | Facility: HOSPITAL | Age: 28
LOS: 1 days | Discharge: HOME | End: 2018-12-05

## 2018-12-05 DIAGNOSIS — D56.3 THALASSEMIA MINOR: ICD-10-CM

## 2018-12-11 ENCOUNTER — RESULT REVIEW (OUTPATIENT)
Age: 28
End: 2018-12-11

## 2018-12-11 ENCOUNTER — APPOINTMENT (OUTPATIENT)
Dept: HEMATOLOGY ONCOLOGY | Facility: CLINIC | Age: 28
End: 2018-12-11
Payer: COMMERCIAL

## 2018-12-11 VITALS
BODY MASS INDEX: 22.98 KG/M2 | TEMPERATURE: 98.9 F | OXYGEN SATURATION: 100 % | HEART RATE: 76 BPM | RESPIRATION RATE: 16 BRPM | SYSTOLIC BLOOD PRESSURE: 106 MMHG | DIASTOLIC BLOOD PRESSURE: 69 MMHG | WEIGHT: 125.66 LBS

## 2018-12-11 LAB
BASOPHILS # BLD AUTO: 0 K/UL — SIGNIFICANT CHANGE UP (ref 0–0.2)
BASOPHILS NFR BLD AUTO: 0.4 % — SIGNIFICANT CHANGE UP (ref 0–2)
EOSINOPHIL # BLD AUTO: 0.1 K/UL — SIGNIFICANT CHANGE UP (ref 0–0.5)
EOSINOPHIL NFR BLD AUTO: 0.9 % — SIGNIFICANT CHANGE UP (ref 0–6)
HCT VFR BLD CALC: 33.6 % — LOW (ref 34.5–45)
HGB BLD-MCNC: 10.8 G/DL — LOW (ref 11.5–15.5)
LYMPHOCYTES # BLD AUTO: 2.5 K/UL — SIGNIFICANT CHANGE UP (ref 1–3.3)
LYMPHOCYTES # BLD AUTO: 31.4 % — SIGNIFICANT CHANGE UP (ref 13–44)
MCHC RBC-ENTMCNC: 20.6 PG — LOW (ref 27–34)
MCHC RBC-ENTMCNC: 32.2 G/DL — SIGNIFICANT CHANGE UP (ref 32–36)
MCV RBC AUTO: 64 FL — LOW (ref 80–100)
MONOCYTES # BLD AUTO: 0.5 K/UL — SIGNIFICANT CHANGE UP (ref 0–0.9)
MONOCYTES NFR BLD AUTO: 6.5 % — SIGNIFICANT CHANGE UP (ref 2–14)
NEUTROPHILS # BLD AUTO: 4.9 K/UL — SIGNIFICANT CHANGE UP (ref 1.8–7.4)
NEUTROPHILS NFR BLD AUTO: 60.8 % — SIGNIFICANT CHANGE UP (ref 43–77)
PLATELET # BLD AUTO: 246 K/UL — SIGNIFICANT CHANGE UP (ref 150–400)
RBC # BLD: 5.24 M/UL — HIGH (ref 3.8–5.2)
RBC # FLD: 13.3 % — SIGNIFICANT CHANGE UP (ref 10.3–14.5)
WBC # BLD: 8 K/UL — SIGNIFICANT CHANGE UP (ref 3.8–10.5)
WBC # FLD AUTO: 8 K/UL — SIGNIFICANT CHANGE UP (ref 3.8–10.5)

## 2018-12-11 PROCEDURE — 99214 OFFICE O/P EST MOD 30 MIN: CPT

## 2018-12-11 RX ORDER — DOXYCYCLINE HYCLATE 100 MG/1
100 CAPSULE ORAL
Qty: 14 | Refills: 0 | Status: DISCONTINUED | COMMUNITY
Start: 2018-11-06 | End: 2018-12-11

## 2018-12-11 NOTE — REVIEW OF SYSTEMS
[Negative] : Allergic/Immunologic [Fatigue] : no fatigue [Recent Change In Weight] : ~T no recent weight change [Eye Pain] : no eye pain [Vision Problems] : no vision problems [Shortness Of Breath] : no shortness of breath [Cough] : no cough [SOB on Exertion] : no shortness of breath during exertion [Dysuria] : no dysuria [Incontinence] : no incontinence [Vaginal Discharge] : no vaginal discharge [Dysmenorrhea/Abn Vaginal Bleeding] : no dysmenorrhea/abnormal vaginal bleeding [Joint Pain] : no joint pain [Confused] : no confusion [Suicidal] : not suicidal [Easy Bruising] : no tendency for easy bruising [Swollen Glands] : no swollen glands

## 2018-12-11 NOTE — ASSESSMENT
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

## 2018-12-11 NOTE — RESULTS/DATA
[FreeTextEntry1] : Today's CBC (On 12/11/18) wbc 8 Hb 10.8 plt 246\par \par On 6/12/18) wbc 8.9 ANC 5600 Hb 11.4 MCV 66.8 plt 253\par On 4/3/18) wbc 11.9 ANC 8700 Hb 9.5 plt 241 MCV 69\par On 2/7/18) wbc 13.8 Hb 8.2 plt 285\par On 8/2/17) wbc 8.2 Hb 10.3 plt 211\par On 9/30/16 wbc 10.7 Hb 10.3 MCV 66 RDW 20.3 plt 265. B12 level >2000, ferritin 370.\par On 4/29/13 Hb electrophoresis HbA 95.3% Hb A2 was 4.7%\par On 8/13/13 ferritin 35, on 3/4/14 ferritin 72.9 On 6/3/14 ferritin 50.7 On 4/21/15 ferritin 57 On 10/14/15 ferritin 76\par On 3/4/14 B12 level 702 On 10/28/14 B12 level 895, On 4/21/15 B12 level 596\par \par On 10/14/15 wbc 9.2 Hb 10 MCV 66.4 \par On 4/21/15 wbc 10.3 Hb 10.8 MCV 64.6\par On 10/28/14 wbc 6.8 Hb 11.1 MCV 64.9\par On 3/4/14 wbc 8 Hb 10.5 MCV 64.6\par On 9/25/13 wbc 16.4 Hb 7.4 MCV 72.5\par On 9/21/13 wbc 11.4 Hb 8.8 MCV 73.2\par On 8/23/13 wbc 14 Hb 9.1 MCV 71.9\par On 4/29/13 wbc 9 Hb 8.9 MCV 68.3

## 2018-12-11 NOTE — HISTORY OF PRESENT ILLNESS
[de-identified] : Ms. Medina was referred by her Gyn physician for follow up with hematology for known history of anemia and beta-thalassemia. The patient had been seen at [facility] since 2013; she has a history of beta-thal, she is [place], and her 3 yr old son also has thalassemia trait. The patient had anemia after delivering her son, in September 2013 -hb 7.4g/dl. She did not have to have a blood transfusion. She has been diagnosed with iron deficiency and has taken oral iron since then as well as vitamin B12 and folate. The patient reports she is not a vegetarian -eats red meat about 1x/month. She has regular menses, not heavy.  [de-identified] : The patient is here for anemia follow up. She reports that she takes the iron, but 'when i remember it,' because i am very busy with the children.  LMP 12/1/18 -lasted for about 4-5 days, heavy for 2-3 days. No bleeding since then. No SOB/palpitations.

## 2018-12-12 LAB
25(OH)D3 SERPL-MCNC: 22.2 NG/ML
FERRITIN SERPL-MCNC: 336 NG/ML
IRON SATN MFR SERPL: 37 %
IRON SERPL-MCNC: 96 UG/DL
TIBC SERPL-MCNC: 258 UG/DL
UIBC SERPL-MCNC: 162 UG/DL
VIT B12 SERPL-MCNC: 1220 PG/ML

## 2018-12-21 ENCOUNTER — APPOINTMENT (OUTPATIENT)
Dept: DERMATOLOGY | Facility: CLINIC | Age: 28
End: 2018-12-21
Payer: COMMERCIAL

## 2018-12-21 VITALS — DIASTOLIC BLOOD PRESSURE: 64 MMHG | SYSTOLIC BLOOD PRESSURE: 122 MMHG

## 2018-12-21 PROCEDURE — 99213 OFFICE O/P EST LOW 20 MIN: CPT

## 2019-01-04 ENCOUNTER — APPOINTMENT (OUTPATIENT)
Dept: DERMATOLOGY | Facility: CLINIC | Age: 29
End: 2019-01-04

## 2019-03-04 ENCOUNTER — RX RENEWAL (OUTPATIENT)
Age: 29
End: 2019-03-04

## 2019-03-28 ENCOUNTER — OUTPATIENT (OUTPATIENT)
Dept: OUTPATIENT SERVICES | Facility: HOSPITAL | Age: 29
LOS: 1 days | Discharge: HOME | End: 2019-03-28

## 2019-03-28 DIAGNOSIS — D56.3 THALASSEMIA MINOR: ICD-10-CM

## 2019-04-08 ENCOUNTER — RESULT REVIEW (OUTPATIENT)
Age: 29
End: 2019-04-08

## 2019-04-08 ENCOUNTER — APPOINTMENT (OUTPATIENT)
Dept: HEMATOLOGY ONCOLOGY | Facility: CLINIC | Age: 29
End: 2019-04-08
Payer: COMMERCIAL

## 2019-04-08 VITALS
SYSTOLIC BLOOD PRESSURE: 114 MMHG | OXYGEN SATURATION: 100 % | RESPIRATION RATE: 17 BRPM | HEART RATE: 67 BPM | BODY MASS INDEX: 23.79 KG/M2 | WEIGHT: 130.07 LBS | DIASTOLIC BLOOD PRESSURE: 76 MMHG | TEMPERATURE: 99.1 F

## 2019-04-08 LAB
BASOPHILS # BLD AUTO: 0 K/UL — SIGNIFICANT CHANGE UP (ref 0–0.2)
BASOPHILS NFR BLD AUTO: 0.4 % — SIGNIFICANT CHANGE UP (ref 0–2)
EOSINOPHIL # BLD AUTO: 0.1 K/UL — SIGNIFICANT CHANGE UP (ref 0–0.5)
EOSINOPHIL NFR BLD AUTO: 1.3 % — SIGNIFICANT CHANGE UP (ref 0–6)
HCT VFR BLD CALC: 33.3 % — LOW (ref 34.5–45)
HGB BLD-MCNC: 11.2 G/DL — LOW (ref 11.5–15.5)
LYMPHOCYTES # BLD AUTO: 2.5 K/UL — SIGNIFICANT CHANGE UP (ref 1–3.3)
LYMPHOCYTES # BLD AUTO: 25.5 % — SIGNIFICANT CHANGE UP (ref 13–44)
MCHC RBC-ENTMCNC: 21.5 PG — LOW (ref 27–34)
MCHC RBC-ENTMCNC: 33.6 G/DL — SIGNIFICANT CHANGE UP (ref 32–36)
MCV RBC AUTO: 64.1 FL — LOW (ref 80–100)
MONOCYTES # BLD AUTO: 0.6 K/UL — SIGNIFICANT CHANGE UP (ref 0–0.9)
MONOCYTES NFR BLD AUTO: 5.9 % — SIGNIFICANT CHANGE UP (ref 2–14)
NEUTROPHILS # BLD AUTO: 6.5 K/UL — SIGNIFICANT CHANGE UP (ref 1.8–7.4)
NEUTROPHILS NFR BLD AUTO: 66.9 % — SIGNIFICANT CHANGE UP (ref 43–77)
PLATELET # BLD AUTO: 258 K/UL — SIGNIFICANT CHANGE UP (ref 150–400)
RBC # BLD: 5.2 M/UL — SIGNIFICANT CHANGE UP (ref 3.8–5.2)
RBC # FLD: 13.3 % — SIGNIFICANT CHANGE UP (ref 10.3–14.5)
WBC # BLD: 9.7 K/UL — SIGNIFICANT CHANGE UP (ref 3.8–10.5)
WBC # FLD AUTO: 9.7 K/UL — SIGNIFICANT CHANGE UP (ref 3.8–10.5)

## 2019-04-08 PROCEDURE — 99213 OFFICE O/P EST LOW 20 MIN: CPT

## 2019-04-08 RX ORDER — GLUCOSAMINE/MSM/CHONDROIT SULF 500-166.6
1000 TABLET ORAL
Refills: 0 | Status: DISCONTINUED | COMMUNITY
Start: 2018-02-07 | End: 2019-04-08

## 2019-04-08 RX ORDER — CLINDAMYCIN PHOSPHATE 1 G/10ML
1 GEL TOPICAL TWICE DAILY
Qty: 1 | Refills: 0 | Status: DISCONTINUED | COMMUNITY
Start: 2018-10-29 | End: 2019-04-08

## 2019-04-08 RX ORDER — SODIUM ASCORBATE, CHOLECALCIFEROL, DI-ALPHA-TOCOPHERYL ACETATE, THIAMINE MONONITRATE, RIBOFLAVIN, NIACINAMIDE, PYRIDOXINE HCL, FOLIC ACID, CYANOCOBALAMIN, CALCIUM FORMATE, CALCIUM CARBONATE, FERROUS (II) BIS-GLYCINATE CHELATE, POTASSIUM IODIDE, ZINC OXIDE, CHOLINE BITARTRATE, WITH DOCONEXENT AND ICOSAPENT 32-1-230MG
32-1 KIT ORAL DAILY
Refills: 0 | Status: DISCONTINUED | COMMUNITY
Start: 2017-10-23 | End: 2019-04-08

## 2019-04-08 NOTE — RESULTS/DATA
[FreeTextEntry1] : Today's CBC (On 4/8/19) wbc 9.7 Hb 11.2 plt 258 ANC 6500\par \par On 12/11/18) wbc 8 Hb 10.8 plt 246\par On 6/12/18) wbc 8.9 ANC 5600 Hb 11.4 MCV 66.8 plt 253\par On 4/3/18) wbc 11.9 ANC 8700 Hb 9.5 plt 241 MCV 69\par On 2/7/18) wbc 13.8 Hb 8.2 plt 285\par On 8/2/17) wbc 8.2 Hb 10.3 plt 211\par On 9/30/16 wbc 10.7 Hb 10.3 MCV 66 RDW 20.3 plt 265. B12 level >2000, ferritin 370.\par On 4/29/13 Hb electrophoresis HbA 95.3% Hb A2 was 4.7%\par On 8/13/13 ferritin 35, on 3/4/14 ferritin 72.9 On 6/3/14 ferritin 50.7 On 4/21/15 ferritin 57 On 10/14/15 ferritin 76\par On 3/4/14 B12 level 702 On 10/28/14 B12 level 895, On 4/21/15 B12 level 596\par \par On 10/14/15 wbc 9.2 Hb 10 MCV 66.4 \par On 4/21/15 wbc 10.3 Hb 10.8 MCV 64.6\par On 10/28/14 wbc 6.8 Hb 11.1 MCV 64.9\par On 3/4/14 wbc 8 Hb 10.5 MCV 64.6\par On 9/25/13 wbc 16.4 Hb 7.4 MCV 72.5\par On 9/21/13 wbc 11.4 Hb 8.8 MCV 73.2\par On 8/23/13 wbc 14 Hb 9.1 MCV 71.9\par On 4/29/13 wbc 9 Hb 8.9 MCV 68.3

## 2019-04-08 NOTE — ASSESSMENT
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

## 2019-04-08 NOTE — HISTORY OF PRESENT ILLNESS
[de-identified] : Ms. Medina was referred by her Gyn physician for follow up with hematology for known history of anemia and beta-thalassemia. The patient had been seen at [facility] since 2013; she has a history of beta-thal, she is [place], and her 3 yr old son also has thalassemia trait. The patient had anemia after delivering her son, in September 2013 -hb 7.4g/dl. She did not have to have a blood transfusion. She has been diagnosed with iron deficiency and has taken oral iron since then as well as vitamin B12 and folate. The patient reports she is not a vegetarian -eats red meat about 1x/month. She has regular menses, not heavy.  [de-identified] : The patient is here for anemia follow up. LMP 3/17/19 for 4-5 days. She is off iron. NO bleeding from other sources. She feels tired 'running around after the kids'.

## 2019-04-09 LAB
25(OH)D3 SERPL-MCNC: 29.9 NG/ML
FERRITIN SERPL-MCNC: 316 NG/ML
IRON SATN MFR SERPL: 41 %
IRON SERPL-MCNC: 105 UG/DL
TIBC SERPL-MCNC: 259 UG/DL
TSH SERPL-ACNC: 1.84 UIU/ML
UIBC SERPL-MCNC: 154 UG/DL
VIT B12 SERPL-MCNC: 1273 PG/ML

## 2019-07-23 ENCOUNTER — APPOINTMENT (OUTPATIENT)
Dept: INTERNAL MEDICINE | Facility: CLINIC | Age: 29
End: 2019-07-23

## 2019-07-29 NOTE — DISCHARGE NOTE OB - PRO FEEDING PLAN INFANT OB
Need for prophylactic measure
breastfeeding exclusively

## 2019-08-08 ENCOUNTER — OUTPATIENT (OUTPATIENT)
Dept: OUTPATIENT SERVICES | Facility: HOSPITAL | Age: 29
LOS: 1 days | Discharge: HOME | End: 2019-08-08

## 2019-08-08 DIAGNOSIS — D56.3 THALASSEMIA MINOR: ICD-10-CM

## 2019-08-12 ENCOUNTER — APPOINTMENT (OUTPATIENT)
Dept: HEMATOLOGY ONCOLOGY | Facility: CLINIC | Age: 29
End: 2019-08-12
Payer: COMMERCIAL

## 2019-08-12 ENCOUNTER — RESULT REVIEW (OUTPATIENT)
Age: 29
End: 2019-08-12

## 2019-08-12 VITALS
BODY MASS INDEX: 24.19 KG/M2 | OXYGEN SATURATION: 100 % | DIASTOLIC BLOOD PRESSURE: 69 MMHG | SYSTOLIC BLOOD PRESSURE: 105 MMHG | HEART RATE: 72 BPM | WEIGHT: 132.28 LBS | TEMPERATURE: 97.9 F | RESPIRATION RATE: 16 BRPM

## 2019-08-12 LAB
BASOPHILS # BLD AUTO: 0.1 K/UL — SIGNIFICANT CHANGE UP (ref 0–0.2)
BASOPHILS NFR BLD AUTO: 0.8 % — SIGNIFICANT CHANGE UP (ref 0–2)
EOSINOPHIL # BLD AUTO: 0.1 K/UL — SIGNIFICANT CHANGE UP (ref 0–0.5)
EOSINOPHIL NFR BLD AUTO: 1 % — SIGNIFICANT CHANGE UP (ref 0–6)
HCT VFR BLD CALC: 35.1 % — SIGNIFICANT CHANGE UP (ref 34.5–45)
HGB BLD-MCNC: 10.9 G/DL — LOW (ref 11.5–15.5)
LYMPHOCYTES # BLD AUTO: 2.1 K/UL — SIGNIFICANT CHANGE UP (ref 1–3.3)
LYMPHOCYTES # BLD AUTO: 29.7 % — SIGNIFICANT CHANGE UP (ref 13–44)
MCHC RBC-ENTMCNC: 20.7 PG — LOW (ref 27–34)
MCHC RBC-ENTMCNC: 31.1 G/DL — LOW (ref 32–36)
MCV RBC AUTO: 66.7 FL — LOW (ref 80–100)
MONOCYTES # BLD AUTO: 0.5 K/UL — SIGNIFICANT CHANGE UP (ref 0–0.9)
MONOCYTES NFR BLD AUTO: 6.5 % — SIGNIFICANT CHANGE UP (ref 2–14)
NEUTROPHILS # BLD AUTO: 4.4 K/UL — SIGNIFICANT CHANGE UP (ref 1.8–7.4)
NEUTROPHILS NFR BLD AUTO: 62 % — SIGNIFICANT CHANGE UP (ref 43–77)
PLATELET # BLD AUTO: 236 K/UL — SIGNIFICANT CHANGE UP (ref 150–400)
RBC # BLD: 5.25 M/UL — HIGH (ref 3.8–5.2)
RBC # FLD: 13 % — SIGNIFICANT CHANGE UP (ref 10.3–14.5)
WBC # BLD: 7.1 K/UL — SIGNIFICANT CHANGE UP (ref 3.8–10.5)
WBC # FLD AUTO: 7.1 K/UL — SIGNIFICANT CHANGE UP (ref 3.8–10.5)

## 2019-08-12 PROCEDURE — 99214 OFFICE O/P EST MOD 30 MIN: CPT

## 2019-08-12 RX ORDER — MULTIVIT-MIN/FOLIC/VIT K/LYCOP 400-300MCG
25 MCG TABLET ORAL DAILY
Refills: 0 | Status: DISCONTINUED | COMMUNITY
Start: 2018-04-03 | End: 2019-08-12

## 2019-08-12 RX ORDER — PRENATAL WITH FERROUS FUM AND FOLIC ACID 3080; 920; 120; 400; 22; 1.84; 3; 20; 10; 1; 12; 200; 27; 25; 2 [IU]/1; [IU]/1; MG/1; [IU]/1; MG/1; MG/1; MG/1; MG/1; MG/1; MG/1; UG/1; MG/1; MG/1; MG/1; MG/1
27-1 TABLET ORAL DAILY
Refills: 0 | Status: DISCONTINUED | COMMUNITY
Start: 2019-04-08 | End: 2019-08-12

## 2019-08-12 NOTE — REVIEW OF SYSTEMS
[Negative] : Allergic/Immunologic [Fatigue] : no fatigue [Eye Pain] : no eye pain [Recent Change In Weight] : ~T no recent weight change [Vision Problems] : no vision problems [Shortness Of Breath] : no shortness of breath [Cough] : no cough [SOB on Exertion] : no shortness of breath during exertion [Dysuria] : no dysuria [Incontinence] : no incontinence [Vaginal Discharge] : no vaginal discharge [Dysmenorrhea/Abn Vaginal Bleeding] : no dysmenorrhea/abnormal vaginal bleeding [Joint Pain] : no joint pain [Confused] : no confusion [Suicidal] : not suicidal [Easy Bruising] : no tendency for easy bruising [Swollen Glands] : no swollen glands

## 2019-08-12 NOTE — ASSESSMENT
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

## 2019-08-12 NOTE — RESULTS/DATA
[FreeTextEntry1] : Today's CBC (On 8/12/19) wbc 7.1 Hb 10.9 plt 236 ANC 9400\par \par On 4/8/19) wbc 9.7 Hb 11.2 plt 258 ANC 6500\par On 12/11/18) wbc 8 Hb 10.8 plt 246\par On 6/12/18) wbc 8.9 ANC 5600 Hb 11.4 MCV 66.8 plt 253\par On 4/3/18) wbc 11.9 ANC 8700 Hb 9.5 plt 241 MCV 69\par On 2/7/18) wbc 13.8 Hb 8.2 plt 285\par On 8/2/17) wbc 8.2 Hb 10.3 plt 211\par On 9/30/16 wbc 10.7 Hb 10.3 MCV 66 RDW 20.3 plt 265. B12 level >2000, ferritin 370.\par On 4/29/13 Hb electrophoresis HbA 95.3% Hb A2 was 4.7%\par On 8/13/13 ferritin 35, on 3/4/14 ferritin 72.9 On 6/3/14 ferritin 50.7 On 4/21/15 ferritin 57 On 10/14/15 ferritin 76\par On 3/4/14 B12 level 702 On 10/28/14 B12 level 895, On 4/21/15 B12 level 596\par \par On 10/14/15 wbc 9.2 Hb 10 MCV 66.4 \par On 4/21/15 wbc 10.3 Hb 10.8 MCV 64.6\par On 10/28/14 wbc 6.8 Hb 11.1 MCV 64.9\par On 3/4/14 wbc 8 Hb 10.5 MCV 64.6\par On 9/25/13 wbc 16.4 Hb 7.4 MCV 72.5\par On 9/21/13 wbc 11.4 Hb 8.8 MCV 73.2\par On 8/23/13 wbc 14 Hb 9.1 MCV 71.9\par On 4/29/13 wbc 9 Hb 8.9 MCV 68.3

## 2019-08-12 NOTE — HISTORY OF PRESENT ILLNESS
[de-identified] : Ms. Medina was referred by her Gyn physician for follow up with hematology for known history of anemia and beta-thalassemia. The patient had been seen at [facility] since 2013; she has a history of beta-thal, she is [place], and her 3 yr old son also has thalassemia trait. The patient had anemia after delivering her son, in September 2013 -hb 7.4g/dl. She did not have to have a blood transfusion. She has been diagnosed with iron deficiency and has taken oral iron since then as well as vitamin B12 and folate. The patient reports she is not a vegetarian -eats red meat about 1x/month. She has regular menses, not heavy.  [de-identified] : The patient is here for anemia follow up. LMP 1 wk ago -it was regular. Feels 'sleepy, ' not tired.

## 2019-08-13 LAB
FERRITIN SERPL-MCNC: 304 NG/ML
IRON SATN MFR SERPL: 51 %
IRON SERPL-MCNC: 128 UG/DL
TIBC SERPL-MCNC: 253 UG/DL
UIBC SERPL-MCNC: 125 UG/DL
VIT B12 SERPL-MCNC: 947 PG/ML

## 2019-08-19 ENCOUNTER — NON-APPOINTMENT (OUTPATIENT)
Age: 29
End: 2019-08-19

## 2019-08-19 ENCOUNTER — APPOINTMENT (OUTPATIENT)
Dept: INTERNAL MEDICINE | Facility: CLINIC | Age: 29
End: 2019-08-19
Payer: COMMERCIAL

## 2019-08-19 VITALS
BODY MASS INDEX: 24.55 KG/M2 | HEIGHT: 61 IN | HEART RATE: 80 BPM | SYSTOLIC BLOOD PRESSURE: 119 MMHG | WEIGHT: 130 LBS | DIASTOLIC BLOOD PRESSURE: 77 MMHG

## 2019-08-19 DIAGNOSIS — Z00.00 ENCOUNTER FOR GENERAL ADULT MEDICAL EXAMINATION W/OUT ABNORMAL FINDINGS: ICD-10-CM

## 2019-08-19 LAB
BILIRUB UR QL STRIP: NORMAL
CLARITY UR: CLEAR
COLLECTION METHOD: NORMAL
GLUCOSE UR-MCNC: NORMAL
HCG UR QL: 0.2 EU/DL
HGB UR QL STRIP.AUTO: NORMAL
KETONES UR-MCNC: NORMAL
LEUKOCYTE ESTERASE UR QL STRIP: NORMAL
NITRITE UR QL STRIP: NORMAL
PH UR STRIP: 6
PROT UR STRIP-MCNC: NORMAL
SP GR UR STRIP: 1.02

## 2019-08-19 PROCEDURE — G0442 ANNUAL ALCOHOL SCREEN 15 MIN: CPT

## 2019-08-19 PROCEDURE — 81003 URINALYSIS AUTO W/O SCOPE: CPT | Mod: QW

## 2019-08-19 PROCEDURE — 93000 ELECTROCARDIOGRAM COMPLETE: CPT

## 2019-08-19 PROCEDURE — 99385 PREV VISIT NEW AGE 18-39: CPT | Mod: 25

## 2019-08-19 PROCEDURE — G0444 DEPRESSION SCREEN ANNUAL: CPT

## 2019-08-19 PROCEDURE — 36415 COLL VENOUS BLD VENIPUNCTURE: CPT

## 2019-08-19 NOTE — PHYSICAL EXAM
[Pedal Pulses Present] : the pedal pulses are present [Declined Breast Exam] : declined breast exam  [No Edema] : there was no peripheral edema [Normal Posterior Cervical Nodes] : no posterior cervical lymphadenopathy [Normal Anterior Cervical Nodes] : no anterior cervical lymphadenopathy [Normal] : affect was normal and insight and judgment were intact

## 2019-08-19 NOTE — COUNSELING
[Potential consequences of obesity discussed] : Potential consequences of obesity discussed [Benefits of weight loss discussed] : Benefits of weight loss discussed [Structured Weight Management Program suggested:] : Structured weight management program suggested [Encouraged to maintain food diary] : Encouraged to maintain food diary [Encouraged to increase physical activity] : Encouraged to increase physical activity [Encouraged to use exercise tracking device] : Encouraged to use exercise tracking device [Decrease Portions] : decrease portions [____ min/wk Activity] : [unfilled] min/wk activity

## 2019-08-19 NOTE — PLAN
[FreeTextEntry1] : \par Annual \par -Advised to get yearly Flu shot \par -Advised Yearly Eye exam with Ophthalmologist\par -Advised Yearly Dental exam\par -Educated of the importance of Healthy diet, such as Mediterranean Diet and Exercise, such as walking >20 minutes a day and increasing gradually as tolerated \par -Educated on the importance of limiting alcohol use to less than 5 drinks per week and refraining from tobacco and drug use.\par \par b thalessemia, stable \par -cont supplements \par \par all questions and concerns addressed with patient in detail.  patient verbalized back instructions in his own words.\par \par Spent >60 minutes in direct patient care and addressed all questions and concerns.  >50% of this time was in direct face to face contact with patient during exam and counseling.  .  Acute and chronic health maintenance covered during that time.  \par \par \par f/u in 1 year

## 2019-08-19 NOTE — HEALTH RISK ASSESSMENT
[Good] : ~his/her~  mood as  good [Yes] : Yes [3 or 4 (1 pt)] : 3 or 4  (1 point) [Never (0 pts)] : Never (0 points) [No] : In the past 12 months have you used drugs other than those required for medical reasons? No [No falls in past year] : Patient reported no falls in the past year [0] : 1) Little interest or pleasure doing things: Not at all (0) [Patient reported PAP Smear was normal] : Patient reported PAP Smear was normal [# of Members in Household ___] :  household currently consist of [unfilled] member(s) [Unemployed] : unemployed [] :  [Sexually Active] : sexually active [Fully functional (bathing, dressing, toileting, transferring, walking, feeding)] : Fully functional (bathing, dressing, toileting, transferring, walking, feeding) [Fully functional (using the telephone, shopping, preparing meals, housekeeping, doing laundry, using] : Fully functional and needs no help or supervision to perform IADLs (using the telephone, shopping, preparing meals, housekeeping, doing laundry, using transportation, managing medications and managing finances) [] : No [Audit-CScore] : 1 [de-identified] : occasionally, but has 2 children at home  [de-identified] : prepare mostly own food.  lwow salt and fat  [[id number]] : 0 [Change in mental status noted] : No change in mental status noted [High Risk Behavior] : no high risk behavior [Reports changes in hearing] : Reports no changes in hearing [Reports changes in vision] : Reports no changes in vision [PapSmearDate] : 10/2018

## 2019-08-19 NOTE — HISTORY OF PRESENT ILLNESS
[de-identified] : here to establish care\par denies any acute complaints \par is busy throughout the day with two young children at home \par \par h/o beta thalessemia, on folic acid, b12 and vitd.  +/- iron tabs.  labs taken on 8/12 reviewed, overall at baseline \par h/o gestational dm, diet controlled at that time \par fmhx of pre-dm \par \par

## 2019-08-20 LAB
25(OH)D3 SERPL-MCNC: 20.8 NG/ML
ALBUMIN SERPL ELPH-MCNC: 4.3 G/DL
ALP BLD-CCNC: 89 U/L
ALT SERPL-CCNC: 7 U/L
ANION GAP SERPL CALC-SCNC: 7 MMOL/L
AST SERPL-CCNC: 10 U/L
BASOPHILS # BLD AUTO: 0.03 K/UL
BASOPHILS NFR BLD AUTO: 0.4 %
BILIRUB DIRECT SERPL-MCNC: 0.1 MG/DL
BILIRUB INDIRECT SERPL-MCNC: 0.4 MG/DL
BILIRUB SERPL-MCNC: 0.5 MG/DL
BUN SERPL-MCNC: 12 MG/DL
CALCIUM SERPL-MCNC: 8.8 MG/DL
CHLORIDE SERPL-SCNC: 105 MMOL/L
CHOLEST SERPL-MCNC: 185 MG/DL
CHOLEST/HDLC SERPL: 3.7 RATIO
CO2 SERPL-SCNC: 26 MMOL/L
CREAT SERPL-MCNC: 0.63 MG/DL
EOSINOPHIL # BLD AUTO: 0.06 K/UL
EOSINOPHIL NFR BLD AUTO: 0.8 %
ESTIMATED AVERAGE GLUCOSE: 103 MG/DL
GLUCOSE SERPL-MCNC: 91 MG/DL
HBA1C MFR BLD HPLC: 5.2 %
HCT VFR BLD CALC: 35.4 %
HDLC SERPL-MCNC: 50 MG/DL
HGB BLD-MCNC: 10.2 G/DL
IMM GRANULOCYTES NFR BLD AUTO: 0.4 %
LDLC SERPL CALC-MCNC: 120 MG/DL
LYMPHOCYTES # BLD AUTO: 2.01 K/UL
LYMPHOCYTES NFR BLD AUTO: 27 %
MAN DIFF?: NORMAL
MCHC RBC-ENTMCNC: 19.9 PG
MCHC RBC-ENTMCNC: 28.8 GM/DL
MCV RBC AUTO: 69.1 FL
MONOCYTES # BLD AUTO: 0.51 K/UL
MONOCYTES NFR BLD AUTO: 6.8 %
NEUTROPHILS # BLD AUTO: 4.81 K/UL
NEUTROPHILS NFR BLD AUTO: 64.6 %
PLATELET # BLD AUTO: 244 K/UL
POTASSIUM SERPL-SCNC: 4.1 MMOL/L
PROT SERPL-MCNC: 7.3 G/DL
RBC # BLD: 5.12 M/UL
RBC # FLD: 14.9 %
SAVE SPECIMEN: NORMAL
SODIUM SERPL-SCNC: 138 MMOL/L
TRIGL SERPL-MCNC: 74 MG/DL
TSH SERPL-ACNC: 0.72 UIU/ML
WBC # FLD AUTO: 7.45 K/UL

## 2019-12-08 ENCOUNTER — OUTPATIENT (OUTPATIENT)
Dept: OUTPATIENT SERVICES | Facility: HOSPITAL | Age: 29
LOS: 1 days | Discharge: HOME | End: 2019-12-08

## 2019-12-08 DIAGNOSIS — D56.3 THALASSEMIA MINOR: ICD-10-CM

## 2019-12-11 DIAGNOSIS — R53.83 OTHER FATIGUE: ICD-10-CM

## 2019-12-16 ENCOUNTER — RESULT REVIEW (OUTPATIENT)
Age: 29
End: 2019-12-16

## 2019-12-16 ENCOUNTER — APPOINTMENT (OUTPATIENT)
Dept: HEMATOLOGY ONCOLOGY | Facility: CLINIC | Age: 29
End: 2019-12-16
Payer: COMMERCIAL

## 2019-12-16 VITALS
RESPIRATION RATE: 18 BRPM | HEART RATE: 74 BPM | DIASTOLIC BLOOD PRESSURE: 72 MMHG | OXYGEN SATURATION: 100 % | WEIGHT: 129.63 LBS | TEMPERATURE: 99.1 F | BODY MASS INDEX: 24.49 KG/M2 | SYSTOLIC BLOOD PRESSURE: 110 MMHG

## 2019-12-16 DIAGNOSIS — D64.9 ANEMIA, UNSPECIFIED: ICD-10-CM

## 2019-12-16 DIAGNOSIS — L72.0 EPIDERMAL CYST: ICD-10-CM

## 2019-12-16 LAB
BASOPHILS # BLD AUTO: 0 K/UL — SIGNIFICANT CHANGE UP (ref 0–0.2)
BASOPHILS NFR BLD AUTO: 0.2 % — SIGNIFICANT CHANGE UP (ref 0–2)
EOSINOPHIL # BLD AUTO: 0.3 K/UL — SIGNIFICANT CHANGE UP (ref 0–0.5)
EOSINOPHIL NFR BLD AUTO: 3.5 % — SIGNIFICANT CHANGE UP (ref 0–6)
HCT VFR BLD CALC: 33.4 % — LOW (ref 34.5–45)
HGB BLD-MCNC: 10.7 G/DL — LOW (ref 11.5–15.5)
LYMPHOCYTES # BLD AUTO: 2.2 K/UL — SIGNIFICANT CHANGE UP (ref 1–3.3)
LYMPHOCYTES # BLD AUTO: 24.5 % — SIGNIFICANT CHANGE UP (ref 13–44)
MCHC RBC-ENTMCNC: 20.9 PG — LOW (ref 27–34)
MCHC RBC-ENTMCNC: 32 G/DL — SIGNIFICANT CHANGE UP (ref 32–36)
MCV RBC AUTO: 65.3 FL — LOW (ref 80–100)
MONOCYTES # BLD AUTO: 0.6 K/UL — SIGNIFICANT CHANGE UP (ref 0–0.9)
MONOCYTES NFR BLD AUTO: 7 % — SIGNIFICANT CHANGE UP (ref 2–14)
NEUTROPHILS # BLD AUTO: 6 K/UL — SIGNIFICANT CHANGE UP (ref 1.8–7.4)
NEUTROPHILS NFR BLD AUTO: 64.8 % — SIGNIFICANT CHANGE UP (ref 43–77)
PLATELET # BLD AUTO: 269 K/UL — SIGNIFICANT CHANGE UP (ref 150–400)
RBC # BLD: 5.11 M/UL — SIGNIFICANT CHANGE UP (ref 3.8–5.2)
RBC # FLD: 13.4 % — SIGNIFICANT CHANGE UP (ref 10.3–14.5)
WBC # BLD: 9.2 K/UL — SIGNIFICANT CHANGE UP (ref 3.8–10.5)
WBC # FLD AUTO: 9.2 K/UL — SIGNIFICANT CHANGE UP (ref 3.8–10.5)

## 2019-12-16 PROCEDURE — 99214 OFFICE O/P EST MOD 30 MIN: CPT

## 2019-12-16 NOTE — REVIEW OF SYSTEMS
[Negative] : Allergic/Immunologic [Vision Problems] : no vision problems [Recent Change In Weight] : ~T no recent weight change [Fatigue] : no fatigue [Eye Pain] : no eye pain [Cough] : no cough [Shortness Of Breath] : no shortness of breath [Dysuria] : no dysuria [SOB on Exertion] : no shortness of breath during exertion [Incontinence] : no incontinence [Dysmenorrhea/Abn Vaginal Bleeding] : no dysmenorrhea/abnormal vaginal bleeding [Vaginal Discharge] : no vaginal discharge [Joint Pain] : no joint pain [Confused] : no confusion [Suicidal] : not suicidal [Easy Bruising] : no tendency for easy bruising [Swollen Glands] : no swollen glands

## 2019-12-16 NOTE — HISTORY OF PRESENT ILLNESS
[de-identified] : Ms. Medina was referred by her Gyn physician for follow up with hematology for known history of anemia and beta-thalassemia. The patient had been seen at [facility] since 2013; she has a history of beta-thal, she is [place], and her 3 yr old son also has thalassemia trait. The patient had anemia after delivering her son, in September 2013 -hb 7.4g/dl. She did not have to have a blood transfusion. She has been diagnosed with iron deficiency and has taken oral iron since then as well as vitamin B12 and folate. The patient reports she is not a vegetarian -eats red meat about 1x/month. She has regular menses, not heavy.  [de-identified] : The patient is here for anemia follow up, has beta-thal trait. She had LMP about 3 wks ago -regular flow, 4-5 days, no blood clots. Mild fatigue.

## 2019-12-16 NOTE — ASSESSMENT
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

## 2019-12-17 LAB
FERRITIN SERPL-MCNC: 258 NG/ML
FOLATE SERPL-MCNC: 17.1 NG/ML
IRON SATN MFR SERPL: 38 %
IRON SERPL-MCNC: 94 UG/DL
TIBC SERPL-MCNC: 247 UG/DL
UIBC SERPL-MCNC: 153 UG/DL
VIT B12 SERPL-MCNC: 1106 PG/ML

## 2020-01-31 NOTE — RESULTS/DATA
"Pt was seen in ER on 1/21/20 for dizziness.     He had BP issues while in ER per wife ER stated he needs f/u with PCP    This am pt was feeling \"lightheaded\" so checked BP.  Initial reading, before am amlodipine 171//103 did not check HR, this was at 530 am.     Check 1 hour post amlodipine 158/86 HR 80     While on phone checked BP again 188/94 HR 65.     Pt denies chest pain, no SOB and does not feel like he is having palpitations.   He states the lightheadedness has cleared.       Wife did check her BP and it was 124/84 \"which is normal for me\"  BP monitor is new and is arm cuff.     Writer did schedule pt in same day slot for 2/3 at 11 am, advised to check BP BID at least 1 hour post med.  They do make sure pt sits for at least 10 min with no crossed legs when checking.   Advised to try the meclozine for dizziness/lightheadedness to see if this helps. If he develops any chest pain will be seen in ER.     OK with plan as above or should he be seen sooner?  Change in med?    Per PCP agree with above. -     Message handled by Nurse Triage with [facility] - provider name: Jd Almaraz MD.    Dona Da Silva RN    " [FreeTextEntry1] : Today's CBC (ON 12/16/19) wbc 9.2 hb 10.7 plt 269 ANC 6000\par \par On 8/12/19) wbc 7.1 Hb 10.9 plt 236 ANC 9400\par On 4/8/19) wbc 9.7 Hb 11.2 plt 258 ANC 6500\par On 12/11/18) wbc 8 Hb 10.8 plt 246\par On 6/12/18) wbc 8.9 ANC 5600 Hb 11.4 MCV 66.8 plt 253\par On 4/3/18) wbc 11.9 ANC 8700 Hb 9.5 plt 241 MCV 69\par On 2/7/18) wbc 13.8 Hb 8.2 plt 285\par On 8/2/17) wbc 8.2 Hb 10.3 plt 211\par On 9/30/16 wbc 10.7 Hb 10.3 MCV 66 RDW 20.3 plt 265. B12 level >2000, ferritin 370.\par On 4/29/13 Hb electrophoresis HbA 95.3% Hb A2 was 4.7%\par On 8/13/13 ferritin 35, on 3/4/14 ferritin 72.9 On 6/3/14 ferritin 50.7 On 4/21/15 ferritin 57 On 10/14/15 ferritin 76\par On 3/4/14 B12 level 702 On 10/28/14 B12 level 895, On 4/21/15 B12 level 596\par \par On 10/14/15 wbc 9.2 Hb 10 MCV 66.4 \par On 4/21/15 wbc 10.3 Hb 10.8 MCV 64.6\par On 10/28/14 wbc 6.8 Hb 11.1 MCV 64.9\par On 3/4/14 wbc 8 Hb 10.5 MCV 64.6\par On 9/25/13 wbc 16.4 Hb 7.4 MCV 72.5\par On 9/21/13 wbc 11.4 Hb 8.8 MCV 73.2\par On 8/23/13 wbc 14 Hb 9.1 MCV 71.9\par On 4/29/13 wbc 9 Hb 8.9 MCV 68.3

## 2020-06-11 ENCOUNTER — OUTPATIENT (OUTPATIENT)
Dept: OUTPATIENT SERVICES | Facility: HOSPITAL | Age: 30
LOS: 1 days | Discharge: HOME | End: 2020-06-11

## 2020-06-11 DIAGNOSIS — D56.3 THALASSEMIA MINOR: ICD-10-CM

## 2020-06-11 DIAGNOSIS — D64.9 ANEMIA, UNSPECIFIED: ICD-10-CM

## 2020-06-15 ENCOUNTER — APPOINTMENT (OUTPATIENT)
Dept: HEMATOLOGY ONCOLOGY | Facility: CLINIC | Age: 30
End: 2020-06-15
Payer: COMMERCIAL

## 2020-06-15 PROCEDURE — 99201 OFFICE OUTPATIENT NEW 10 MINUTES: CPT | Mod: 95

## 2020-06-15 NOTE — RESULTS/DATA
[FreeTextEntry1] : ON 12/16/19) wbc 9.2 hb 10.7 plt 269 ANC 6000\par \par On 8/12/19) wbc 7.1 Hb 10.9 plt 236 ANC 9400\par On 4/8/19) wbc 9.7 Hb 11.2 plt 258 ANC 6500\par On 12/11/18) wbc 8 Hb 10.8 plt 246\par On 6/12/18) wbc 8.9 ANC 5600 Hb 11.4 MCV 66.8 plt 253\par On 4/3/18) wbc 11.9 ANC 8700 Hb 9.5 plt 241 MCV 69\par On 2/7/18) wbc 13.8 Hb 8.2 plt 285\par On 8/2/17) wbc 8.2 Hb 10.3 plt 211\par On 9/30/16 wbc 10.7 Hb 10.3 MCV 66 RDW 20.3 plt 265. B12 level >2000, ferritin 370.\par On 4/29/13 Hb electrophoresis HbA 95.3% Hb A2 was 4.7%\par On 8/13/13 ferritin 35, on 3/4/14 ferritin 72.9 On 6/3/14 ferritin 50.7 On 4/21/15 ferritin 57 On 10/14/15 ferritin 76\par On 3/4/14 B12 level 702 On 10/28/14 B12 level 895, On 4/21/15 B12 level 596\par \par On 10/14/15 wbc 9.2 Hb 10 MCV 66.4 \par On 4/21/15 wbc 10.3 Hb 10.8 MCV 64.6\par On 10/28/14 wbc 6.8 Hb 11.1 MCV 64.9\par On 3/4/14 wbc 8 Hb 10.5 MCV 64.6\par On 9/25/13 wbc 16.4 Hb 7.4 MCV 72.5\par On 9/21/13 wbc 11.4 Hb 8.8 MCV 73.2\par On 8/23/13 wbc 14 Hb 9.1 MCV 71.9\par On 4/29/13 wbc 9 Hb 8.9 MCV 68.3

## 2020-06-15 NOTE — ASSESSMENT
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 Pt asymptomatic, menses regular. LMP end of May 2020. No bleeding/bruising, last counts in Dec 2019 Hb 10.7, at baseline. d/w pt checking CBC at home vs. waiting to have it checked in the office; given counts have been stable, she is asymptomatic, would rather wait until she is able to come to the office\par \par -follow up in 4 months

## 2020-06-15 NOTE — REVIEW OF SYSTEMS
[Negative] : Heme/Lymph [Fatigue] : no fatigue [Recent Change In Weight] : ~T no recent weight change [Vision Problems] : no vision problems [Eye Pain] : no eye pain [Shortness Of Breath] : no shortness of breath [Cough] : no cough [Dysuria] : no dysuria [SOB on Exertion] : no shortness of breath during exertion [Incontinence] : no incontinence [Vaginal Discharge] : no vaginal discharge [Dysmenorrhea/Abn Vaginal Bleeding] : no dysmenorrhea/abnormal vaginal bleeding [Joint Pain] : no joint pain [Confused] : no confusion [Suicidal] : not suicidal [Easy Bruising] : no tendency for easy bruising [Swollen Glands] : no swollen glands

## 2020-06-15 NOTE — HISTORY OF PRESENT ILLNESS
[Home] : at home, [unfilled] , at the time of the visit. [Medical Office: ([facility])___] : at the medical office located in  [de-identified] : Ms. Medina was referred by her Gyn physician for follow up with hematology for known history of anemia and beta-thalassemia. The patient had been seen at [facility] since 2013; she has a history of beta-thal, she is [place], and her 3 yr old son also has thalassemia trait. The patient had anemia after delivering her son, in September 2013 -hb 7.4g/dl. She did not have to have a blood transfusion. She has been diagnosed with iron deficiency and has taken oral iron since then as well as vitamin B12 and folate. The patient reports she is not a vegetarian -eats red meat about 1x/month. She has regular menses, not heavy.  [de-identified] : The patient is followed for beta-thal trait. Patient seen via Telehealth today in order to minimize risk of tory COVID-19 infection. Verbal consent given on  6/15/20 at 1:05pm by patient. She is at home with her  and 2 children. She feels well, no fatigue. No sick contacts. She has regular menses, lasting about 4 days.\par \par \par

## 2020-06-29 ENCOUNTER — APPOINTMENT (OUTPATIENT)
Dept: HEMATOLOGY ONCOLOGY | Facility: CLINIC | Age: 30
End: 2020-06-29

## 2020-10-12 ENCOUNTER — OUTPATIENT (OUTPATIENT)
Dept: OUTPATIENT SERVICES | Facility: HOSPITAL | Age: 30
LOS: 1 days | Discharge: HOME | End: 2020-10-12

## 2020-10-12 DIAGNOSIS — D56.3 THALASSEMIA MINOR: ICD-10-CM

## 2020-10-14 ENCOUNTER — APPOINTMENT (OUTPATIENT)
Dept: HEMATOLOGY ONCOLOGY | Facility: CLINIC | Age: 30
End: 2020-10-14

## 2020-11-04 ENCOUNTER — APPOINTMENT (OUTPATIENT)
Dept: HEMATOLOGY ONCOLOGY | Facility: CLINIC | Age: 30
End: 2020-11-04
Payer: COMMERCIAL

## 2020-11-04 PROCEDURE — 99214 OFFICE O/P EST MOD 30 MIN: CPT | Mod: 95

## 2020-11-05 NOTE — ASSESSMENT
[FreeTextEntry1] : 30 year-old Ms. HERMOSILLO is seeen in f/u for anemia. Patient is known thalassemia minor (Beta+) with hgb ranging from 10-11. Noted to have mild high ferritin.  \par \par \par # Anemia, Beta+ Thalassemia \par - H/o 1 PRBC transfusion during child birth \par - Stable Hgb at 10-11 \par - Follow up in 6 months \par (Pre-V: CBC, Ferritin, Iron studies, B12, Folate)

## 2020-11-05 NOTE — RESULTS/DATA
[FreeTextEntry1] : 11/4/2020\par Hgb Electrophoresis (scanned results from 2013) reviewed. \par Hgb A: 95.3\par Hgb A2:  4.7\par Suggestive of Beta= Thalassemia (Thalassemia minor) \par \par Most recent CBC (12/2019)\par WBC: 9.2\par RBC: 5.11\par Hgb: 10.7 \par MCV: 65.3\par RDW: 13.4\par PLT: 269 \par \par Ferritin: 258\par \par \par

## 2020-11-05 NOTE — HISTORY OF PRESENT ILLNESS
[Home] : at home, [unfilled] , at the time of the visit. [Medical Office: ([facility])___] : at the medical office located in  [Verbal consent obtained from patient] : the patient, [unfilled] [de-identified] : A Dr. Leigh patient, first encounter (Tele) with me. \par \par CHART REVIEW: Ms. Medina was referred by her Gyn physician for follow up with hematology for known history of anemia and beta-thalassemia. The patient had been seen at [facility] since 2013; she has a history of beta-thal, she is Stateless, and her 3 yr old son also has thalassemia trait. The patient had anemia after delivering her son, in September 2013 -hb 7.4g/dl. She did not have to have a blood transfusion. She has been diagnosed with iron deficiency and has taken oral iron since then as well as vitamin B12 and folate. The patient reports she is not a vegetarian -eats red meat about 1x/month. She has regular menses, not heavy.  [de-identified] : 11/4/2020\par Patient seen via telehealth. She is doing fine. Has no change in health history. Denies fatigue, weakness, SOB.

## 2021-05-13 ENCOUNTER — OUTPATIENT (OUTPATIENT)
Dept: OUTPATIENT SERVICES | Facility: HOSPITAL | Age: 31
LOS: 1 days | Discharge: HOME | End: 2021-05-13

## 2021-05-13 DIAGNOSIS — D56.3 THALASSEMIA MINOR: ICD-10-CM

## 2021-06-22 ENCOUNTER — OUTPATIENT (OUTPATIENT)
Dept: OUTPATIENT SERVICES | Facility: HOSPITAL | Age: 31
LOS: 1 days | Discharge: HOME | End: 2021-06-22

## 2021-06-22 DIAGNOSIS — D56.3 THALASSEMIA MINOR: ICD-10-CM

## 2021-06-25 ENCOUNTER — RESULT REVIEW (OUTPATIENT)
Age: 31
End: 2021-06-25

## 2021-06-25 ENCOUNTER — APPOINTMENT (OUTPATIENT)
Dept: HEMATOLOGY ONCOLOGY | Facility: CLINIC | Age: 31
End: 2021-06-25
Payer: COMMERCIAL

## 2021-06-25 VITALS
HEIGHT: 61.5 IN | SYSTOLIC BLOOD PRESSURE: 117 MMHG | HEART RATE: 73 BPM | DIASTOLIC BLOOD PRESSURE: 77 MMHG | TEMPERATURE: 97.5 F | BODY MASS INDEX: 24.61 KG/M2 | OXYGEN SATURATION: 100 % | WEIGHT: 132.03 LBS | RESPIRATION RATE: 16 BRPM

## 2021-06-25 DIAGNOSIS — D56.3 THALASSEMIA MINOR: ICD-10-CM

## 2021-06-25 LAB
BASOPHILS # BLD AUTO: 0.04 K/UL — SIGNIFICANT CHANGE UP (ref 0–0.2)
BASOPHILS NFR BLD AUTO: 0.5 % — SIGNIFICANT CHANGE UP (ref 0–2)
EOSINOPHIL # BLD AUTO: 0.15 K/UL — SIGNIFICANT CHANGE UP (ref 0–0.5)
EOSINOPHIL NFR BLD AUTO: 1.9 % — SIGNIFICANT CHANGE UP (ref 0–6)
HCT VFR BLD CALC: 35.6 % — SIGNIFICANT CHANGE UP (ref 34.5–45)
HGB BLD-MCNC: 10.7 G/DL — LOW (ref 11.5–15.5)
IMM GRANULOCYTES NFR BLD AUTO: 0.4 % — SIGNIFICANT CHANGE UP (ref 0–1.5)
LYMPHOCYTES # BLD AUTO: 1.94 K/UL — SIGNIFICANT CHANGE UP (ref 1–3.3)
LYMPHOCYTES # BLD AUTO: 25.2 % — SIGNIFICANT CHANGE UP (ref 13–44)
MCHC RBC-ENTMCNC: 20.2 PG — LOW (ref 27–34)
MCHC RBC-ENTMCNC: 30.1 G/DL — LOW (ref 32–36)
MCV RBC AUTO: 67.3 FL — LOW (ref 80–100)
MONOCYTES # BLD AUTO: 0.51 K/UL — SIGNIFICANT CHANGE UP (ref 0–0.9)
MONOCYTES NFR BLD AUTO: 6.6 % — SIGNIFICANT CHANGE UP (ref 2–14)
NEUTROPHILS # BLD AUTO: 5.03 K/UL — SIGNIFICANT CHANGE UP (ref 1.8–7.4)
NEUTROPHILS NFR BLD AUTO: 65.4 % — SIGNIFICANT CHANGE UP (ref 43–77)
NRBC # BLD: 0 /100 WBCS — SIGNIFICANT CHANGE UP (ref 0–0)
PLATELET # BLD AUTO: 292 K/UL — SIGNIFICANT CHANGE UP (ref 150–400)
RBC # BLD: 5.29 M/UL — HIGH (ref 3.8–5.2)
RBC # FLD: 14.9 % — HIGH (ref 10.3–14.5)
WBC # BLD: 7.7 K/UL — SIGNIFICANT CHANGE UP (ref 3.8–10.5)
WBC # FLD AUTO: 7.7 K/UL — SIGNIFICANT CHANGE UP (ref 3.8–10.5)

## 2021-06-25 PROCEDURE — 99072 ADDL SUPL MATRL&STAF TM PHE: CPT

## 2021-06-25 PROCEDURE — 99212 OFFICE O/P EST SF 10 MIN: CPT

## 2021-06-25 NOTE — HISTORY OF PRESENT ILLNESS
[de-identified] : A Dr. Leigh patient, first encounter (Tele) with me. \par \par CHART REVIEW: Ms. Medina was referred by her Gyn physician for follow up with hematology for known history of anemia and beta-thalassemia. The patient had been seen at [facility] since 2013; she has a history of beta-thal, she is [place], and her 3 yr old son also has thalassemia trait. The patient had anemia after delivering her son, in September 2013 -hb 7.4g/dl. She did not have to have a blood transfusion. She has been diagnosed with iron deficiency and has taken oral iron since then as well as vitamin B12 and folate. The patient reports she is not a vegetarian -eats red meat about 1x/month. She has regular menses, not heavy.  [de-identified] : 11/4/2020\par Patient seen via telehealth. She is doing fine. Has no change in health history. Denies fatigue, weakness, SOB. \par \par 6/25/2021\par Patient presents for a follow up. She endorses no change in her health status. She has no complaints.

## 2021-06-25 NOTE — REVIEW OF SYSTEMS
[Negative] : Allergic/Immunologic [Fatigue] : no fatigue [Recent Change In Weight] : ~T no recent weight change [Eye Pain] : no eye pain [Vision Problems] : no vision problems [Shortness Of Breath] : no shortness of breath [Cough] : no cough [SOB on Exertion] : no shortness of breath during exertion [Dysuria] : no dysuria [Incontinence] : no incontinence [Vaginal Discharge] : no vaginal discharge [Dysmenorrhea/Abn Vaginal Bleeding] : no dysmenorrhea/abnormal vaginal bleeding [Joint Pain] : no joint pain [Confused] : no confusion [Easy Bruising] : no tendency for easy bruising [Suicidal] : not suicidal [Swollen Glands] : no swollen glands

## 2021-06-25 NOTE — ASSESSMENT
[FreeTextEntry1] : 30 year-old Ms. HERMOSILLO is seen in f/u for anemia. Patient is known thalassemia minor (Beta+) with hgb ranging from 10-11. Noted to have mild high ferritin.  \par \par \par # Anemia, Beta+ Thalassemia \par - H/o 1 PRBC transfusion during child birth \par - Stable Hgb at 10-11 \par - Avoid iron pills of high iron containing food \par - Can take folate supplement \par - Pre-V: CBC, Ferritin, Iron studies, B12, Folate\par - Follow up in 1 year \par

## 2021-06-25 NOTE — RESULTS/DATA
[FreeTextEntry1] : 6/25/2021\par No labs since 12/20/2019 copied and discussed below.\par CBC (PRE-F) from today shows stable H/H at 10.7, normal platelets and WBC. \par \par \par 11/4/2020\par Hgb Electrophoresis (scanned results from 2013) reviewed. \par Hgb A: 95.3\par Hgb A2:  4.7\par Suggestive of Beta= Thalassemia (Thalassemia minor) \par \par Most recent CBC (12/2019)\par WBC: 9.2\par RBC: 5.11\par Hgb: 10.7 \par MCV: 65.3\par RDW: 13.4\par PLT: 269 \par \par Ferritin: 258\par \par \par

## 2021-06-28 LAB
ALBUMIN SERPL ELPH-MCNC: 4.4 G/DL
ALP BLD-CCNC: 102 U/L
ALT SERPL-CCNC: 9 U/L
ANION GAP SERPL CALC-SCNC: 12 MMOL/L
AST SERPL-CCNC: 14 U/L
BILIRUB SERPL-MCNC: 0.6 MG/DL
BUN SERPL-MCNC: 11 MG/DL
CALCIUM SERPL-MCNC: 8.7 MG/DL
CHLORIDE SERPL-SCNC: 106 MMOL/L
CO2 SERPL-SCNC: 24 MMOL/L
CREAT SERPL-MCNC: 0.65 MG/DL
FERRITIN SERPL-MCNC: 203 NG/ML
FOLATE SERPL-MCNC: 16.1 NG/ML
GLUCOSE SERPL-MCNC: 91 MG/DL
IRON SATN MFR SERPL: 41 %
IRON SERPL-MCNC: 119 UG/DL
LDH SERPL-CCNC: 146 U/L
POTASSIUM SERPL-SCNC: 3.9 MMOL/L
PROT SERPL-MCNC: 7.4 G/DL
SODIUM SERPL-SCNC: 141 MMOL/L
TIBC SERPL-MCNC: 288 UG/DL
TRANSFERRIN SERPL-MCNC: 242 MG/DL
UIBC SERPL-MCNC: 169 UG/DL
VIT B12 SERPL-MCNC: 1037 PG/ML

## 2021-12-13 NOTE — ED ADULT TRIAGE NOTE - ESI TRIAGE ACUITY LEVEL, MLM
4 Counseling Text: I reviewed the side effect in detail. Patient should get monthly blood tests, not donate blood, not drive at night if vision affected, and not share medication.

## 2022-11-17 ENCOUNTER — RESULT REVIEW (OUTPATIENT)
Age: 32
End: 2022-11-17

## 2025-04-03 ENCOUNTER — NON-APPOINTMENT (OUTPATIENT)
Age: 35
End: 2025-04-03

## 2025-04-04 ENCOUNTER — ASOB RESULT (OUTPATIENT)
Age: 35
End: 2025-04-04

## 2025-04-04 ENCOUNTER — TRANSCRIPTION ENCOUNTER (OUTPATIENT)
Age: 35
End: 2025-04-04

## 2025-04-04 ENCOUNTER — APPOINTMENT (OUTPATIENT)
Dept: ANTEPARTUM | Facility: CLINIC | Age: 35
End: 2025-04-04
Payer: COMMERCIAL

## 2025-04-04 PROCEDURE — 76801 OB US < 14 WKS SINGLE FETUS: CPT

## 2025-04-04 PROCEDURE — 76813 OB US NUCHAL MEAS 1 GEST: CPT

## 2025-06-05 ENCOUNTER — ASOB RESULT (OUTPATIENT)
Age: 35
End: 2025-06-05

## 2025-06-05 ENCOUNTER — APPOINTMENT (OUTPATIENT)
Dept: ANTEPARTUM | Facility: CLINIC | Age: 35
End: 2025-06-05

## 2025-06-05 PROCEDURE — 76811 OB US DETAILED SNGL FETUS: CPT

## 2025-06-09 NOTE — DISCHARGE NOTE OB - DRINK 8 TO 10 GLASSES OF FLUID EACH DAY
This note was copied from a baby's chart.  RN called for assist latching baby.  Mom has been pumping every 3 hours and is getting  ml each time.  Educated on positioning and used a breastfeeding pillow for support.  Recommended to use a 24 mm NS and educated on use and how to apply.  Placed baby in cross cradle hold to left breast and baby latched well with deep jaw excursion and was coordinated with audible swallows heard.  After 10 min, baby pulled off breast and was content and milk visible in NS.  Burped baby then placed to R breast in cross cradle hold.  Mom was able to apply NS independently and baby latched well for 9 min, then fell asleep.  Placed baby in upright position on Mom 's chest.  Encouraged to call for further assist as needed.   
Statement Selected

## 2025-07-08 ENCOUNTER — NON-APPOINTMENT (OUTPATIENT)
Age: 35
End: 2025-07-08

## 2025-07-21 ENCOUNTER — APPOINTMENT (OUTPATIENT)
Dept: MATERNAL FETAL MEDICINE | Facility: CLINIC | Age: 35
End: 2025-07-21
Payer: COMMERCIAL

## 2025-07-21 ENCOUNTER — ASOB RESULT (OUTPATIENT)
Age: 35
End: 2025-07-21

## 2025-07-21 DIAGNOSIS — O24.419 GESTATIONAL DIABETES MELLITUS IN PREGNANCY, UNSPECIFIED CONTROL: ICD-10-CM

## 2025-07-21 PROCEDURE — G0109 DIAB MANAGE TRN IND/GROUP: CPT | Mod: 95

## 2025-07-21 RX ORDER — URINE ACETONE TEST STRIPS
STRIP MISCELLANEOUS
Qty: 2 | Refills: 1 | Status: ACTIVE | COMMUNITY
Start: 2025-07-21 | End: 1900-01-01

## 2025-07-21 RX ORDER — LANCETS 33 GAUGE
EACH MISCELLANEOUS
Qty: 1 | Refills: 2 | Status: ACTIVE | COMMUNITY
Start: 2025-07-21 | End: 1900-01-01

## 2025-07-21 RX ORDER — BLOOD-GLUCOSE METER
W/DEVICE KIT MISCELLANEOUS
Qty: 1 | Refills: 0 | Status: ACTIVE | COMMUNITY
Start: 2025-07-21 | End: 1900-01-01

## 2025-07-21 RX ORDER — BLOOD-GLUCOSE METER
KIT MISCELLANEOUS
Qty: 2 | Refills: 2 | Status: ACTIVE | COMMUNITY
Start: 2025-07-21 | End: 1900-01-01

## 2025-07-24 RX ORDER — BLOOD SUGAR DIAGNOSTIC
STRIP MISCELLANEOUS
Qty: 2 | Refills: 2 | Status: ACTIVE | COMMUNITY
Start: 2025-07-24 | End: 1900-01-01

## 2025-08-04 ENCOUNTER — APPOINTMENT (OUTPATIENT)
Dept: MATERNAL FETAL MEDICINE | Facility: CLINIC | Age: 35
End: 2025-08-04
Payer: COMMERCIAL

## 2025-08-04 ENCOUNTER — ASOB RESULT (OUTPATIENT)
Age: 35
End: 2025-08-04

## 2025-08-04 PROCEDURE — G0108 DIAB MANAGE TRN  PER INDIV: CPT | Mod: 95

## 2025-08-29 ENCOUNTER — APPOINTMENT (OUTPATIENT)
Dept: MATERNAL FETAL MEDICINE | Facility: CLINIC | Age: 35
End: 2025-08-29

## 2025-08-29 ENCOUNTER — ASOB RESULT (OUTPATIENT)
Age: 35
End: 2025-08-29

## 2025-08-29 PROCEDURE — G0108 DIAB MANAGE TRN  PER INDIV: CPT | Mod: 95
